# Patient Record
Sex: MALE | Employment: FULL TIME | URBAN - METROPOLITAN AREA
[De-identification: names, ages, dates, MRNs, and addresses within clinical notes are randomized per-mention and may not be internally consistent; named-entity substitution may affect disease eponyms.]

---

## 2017-05-15 RX ORDER — ATORVASTATIN CALCIUM 20 MG/1
TABLET, FILM COATED ORAL
Qty: 30 TABLET | Refills: 4 | Status: SHIPPED | OUTPATIENT
Start: 2017-05-15 | End: 2017-10-28

## 2017-05-15 NOTE — TELEPHONE ENCOUNTER
From: Sada Le  To: Lyle Torres MD  Sent: 5/14/2017 9:44 PM CDT  Subject: Medication Renewal Request    Original authorizing provider: MD Sada Dinh would like a refill of the following medications:  Atorvastatin Calcium 20 MG

## 2017-06-05 DIAGNOSIS — IMO0002 CHRONIC MIGRAINE: ICD-10-CM

## 2017-06-06 RX ORDER — PROPRANOLOL HYDROCHLORIDE 120 MG/1
120 CAPSULE, EXTENDED RELEASE ORAL DAILY
Qty: 90 CAPSULE | Refills: 3 | Status: CANCELLED | OUTPATIENT
Start: 2017-06-06

## 2017-06-06 NOTE — TELEPHONE ENCOUNTER
From: Alpa Dennison  To: Yoni Santana MD  Sent: 6/5/2017 7:22 PM CDT  Subject: Medication Renewal Request    Original authorizing provider: MD Alpa Multani would like a refill of the following medications:  Propranolol HCl  MG Oral Caps

## 2017-06-06 NOTE — TELEPHONE ENCOUNTER
#90/3 additional refills given at [address] [place] [address] 8/8/16. Should still have refills at pharmacy. Responded to patient via [employer] and provided reminder that follow up due in August 2017.

## 2017-08-08 DIAGNOSIS — IMO0002 CHRONIC MIGRAINE: ICD-10-CM

## 2017-08-09 NOTE — TELEPHONE ENCOUNTER
From: Kaila Schilling  Sent: 8/8/2017 9:36 PM CDT  Subject: Medication Renewal Request    Michael Abreu would like a refill of the following medications:  Propranolol HCl  MG Oral Capsule SR 24 Hr Madelyn Clark MD]    Preferred pharmacy: [id number]

## 2017-08-09 NOTE — TELEPHONE ENCOUNTER
Sent [employer] message to patient relaying the need to schedule an appointment. LMTCB.  Please assist the patient in setting up an appointment, in order to process refill request.         Medication: Propranolol    Date of last refill: 08/08/16  Date last

## 2017-08-10 NOTE — TELEPHONE ENCOUNTER
Pt scheduled an appt to see Dr. Lopez Brochure on 9/5/17. He states he only has 1 pill left of his propanolol.

## 2017-08-11 RX ORDER — PROPRANOLOL HYDROCHLORIDE 120 MG/1
120 CAPSULE, EXTENDED RELEASE ORAL DAILY
Qty: 90 CAPSULE | Refills: 0 | Status: SHIPPED
Start: 2017-08-11 | End: 2017-09-05

## 2017-09-05 ENCOUNTER — OFFICE VISIT (OUTPATIENT)
Dept: NEUROLOGY | Facility: CLINIC | Age: 52
End: 2017-09-05

## 2017-09-05 VITALS
HEART RATE: 76 BPM | DIASTOLIC BLOOD PRESSURE: 78 MMHG | SYSTOLIC BLOOD PRESSURE: 122 MMHG | WEIGHT: 231 LBS | BODY MASS INDEX: 30 KG/M2 | RESPIRATION RATE: 16 BRPM

## 2017-09-05 DIAGNOSIS — IMO0002 CHRONIC MIGRAINE: ICD-10-CM

## 2017-09-05 DIAGNOSIS — G43.109 MIGRAINE WITH AURA AND WITHOUT STATUS MIGRAINOSUS, NOT INTRACTABLE: Primary | ICD-10-CM

## 2017-09-05 DIAGNOSIS — R63.5 WEIGHT GAIN: ICD-10-CM

## 2017-09-05 PROCEDURE — 99213 OFFICE O/P EST LOW 20 MIN: CPT | Performed by: OTHER

## 2017-09-05 RX ORDER — PROPRANOLOL HYDROCHLORIDE 120 MG/1
120 CAPSULE, EXTENDED RELEASE ORAL DAILY
Qty: 90 CAPSULE | Refills: 3 | Status: SHIPPED | OUTPATIENT
Start: 2017-09-05 | End: 2018-11-06

## 2017-09-05 NOTE — PATIENT INSTRUCTIONS
Refill policies:    • Allow 2-3 business days for refills; controlled substances may take longer.   • Contact your pharmacy at least 5 days prior to running out of medication and have them send an electronic request or submit request through the [facility] have a procedure or additional testing performed. [facility]) will contact your insurance carrier to obtain pre-certification or prior authorization.     Unfortunately, [facility] has seen an increase in denial of payment even though the p

## 2017-09-05 NOTE — PROGRESS NOTES
[facility] in [place] with [facility]  Neurology - Clinic Follow up  2017    Yazan Schmid Patient Status:  No patient class for patient encounter    1965 MRN [id number]   Locat change, no throat pain or soreness; Respiratory: Denies: Difficulty Breathing, Chronic Cough and Wheezing. Cardiovascular: NO Chest Pain and Palpitations. GI: no abdominal pain, no nausea or diarrhea;  : no incontinence;   Neurological:  See history; MG Oral Capsule SR 24 Hr          Impression/Plan:  (G43.109) Migraine with aura and without status migrainosus, not intractable  (primary encounter diagnosis)    (G43.709) Chronic migraine  Plan: Propranolol HCl  MG Oral Capsule SR 24 Hr    (R63.5)

## 2017-09-13 ENCOUNTER — HOSPITAL ENCOUNTER (OUTPATIENT)
Age: 52
Discharge: HOME | End: 2017-09-13
Attending: FAMILY MEDICINE
Payer: COMMERCIAL

## 2017-09-13 VITALS
TEMPERATURE: 98 F | HEART RATE: 79 BPM | OXYGEN SATURATION: 97 % | DIASTOLIC BLOOD PRESSURE: 81 MMHG | RESPIRATION RATE: 20 BRPM | SYSTOLIC BLOOD PRESSURE: 124 MMHG

## 2017-09-13 DIAGNOSIS — L03.012 PARONYCHIA OF FINGER OF LEFT HAND: Primary | ICD-10-CM

## 2017-09-13 DIAGNOSIS — L03.012 CELLULITIS OF FINGER OF LEFT HAND: ICD-10-CM

## 2017-09-13 PROCEDURE — 99204 OFFICE O/P NEW MOD 45 MIN: CPT

## 2017-09-13 PROCEDURE — 99213 OFFICE O/P EST LOW 20 MIN: CPT

## 2017-09-13 RX ORDER — AMOXICILLIN AND CLAVULANATE POTASSIUM 875; 125 MG/1; MG/1
1 TABLET, FILM COATED ORAL 2 TIMES DAILY
Qty: 20 TABLET | Refills: 0 | Status: SHIPPED | OUTPATIENT
Start: 2017-09-13 | End: 2017-09-23

## 2017-09-13 NOTE — ED PROVIDER NOTES
Patient Seen in: [facility]    History   Patient presents with:  Finger Pain    Stated Complaint: 3 DAYS FINGER INJURY    HPI  46 yo M here with complaints of f care swelling and finger? Injury.   Now with a white discoloration GI: not distended   EXTREMITIES:     Exam of L Hand -->   - swelling: ++ noted around the nail of the L index finger - white yellow discoloration noted over the lateral edge of the nail of the second finger.   - deformity/defect: none noted   - crepitus: Refills: 0

## 2017-09-16 ENCOUNTER — OFFICE VISIT (OUTPATIENT)
Dept: INTERNAL MEDICINE CLINIC | Facility: CLINIC | Age: 52
End: 2017-09-16

## 2017-09-16 VITALS
HEART RATE: 68 BPM | OXYGEN SATURATION: 93 % | BODY MASS INDEX: 28.88 KG/M2 | HEIGHT: 74 IN | DIASTOLIC BLOOD PRESSURE: 82 MMHG | WEIGHT: 225 LBS | SYSTOLIC BLOOD PRESSURE: 116 MMHG | RESPIRATION RATE: 14 BRPM | TEMPERATURE: 98 F

## 2017-09-16 DIAGNOSIS — R06.83 SNORING: Primary | ICD-10-CM

## 2017-09-16 DIAGNOSIS — G47.30 SLEEP APNEA, UNSPECIFIED TYPE: ICD-10-CM

## 2017-09-16 PROCEDURE — 99213 OFFICE O/P EST LOW 20 MIN: CPT | Performed by: FAMILY MEDICINE

## 2017-09-16 NOTE — PROGRESS NOTES
HPI:    Patient ID: Kaila Schilling is a 45year old male.     HPI  Wife made appt   He snores a lot  Loudly   Nightly   Does have bouts of apnea       Does wake up at times   Is tired at times when awakens    Otherwise feels well    Review of Systems

## 2017-10-31 RX ORDER — ATORVASTATIN CALCIUM 20 MG/1
TABLET, FILM COATED ORAL
Qty: 30 TABLET | Refills: 0 | Status: SHIPPED | OUTPATIENT
Start: 2017-10-31 | End: 2017-12-02

## 2017-11-10 ENCOUNTER — TELEPHONE (OUTPATIENT)
Dept: INTERNAL MEDICINE CLINIC | Facility: CLINIC | Age: 52
End: 2017-11-10

## 2017-11-10 DIAGNOSIS — R73.09 OTHER ABNORMAL GLUCOSE: Primary | ICD-10-CM

## 2017-11-10 DIAGNOSIS — R68.82 DECREASED LIBIDO: ICD-10-CM

## 2017-11-10 DIAGNOSIS — E78.00 PURE HYPERCHOLESTEROLEMIA: ICD-10-CM

## 2017-12-01 ENCOUNTER — OFFICE VISIT (OUTPATIENT)
Dept: SLEEP CENTER | Facility: HOSPITAL | Age: 52
End: 2017-12-01
Attending: FAMILY MEDICINE
Payer: COMMERCIAL

## 2017-12-01 PROCEDURE — 95810 POLYSOM 6/> YRS 4/> PARAM: CPT

## 2017-12-02 ENCOUNTER — OFFICE VISIT (OUTPATIENT)
Dept: INTERNAL MEDICINE CLINIC | Facility: CLINIC | Age: 52
End: 2017-12-02

## 2017-12-02 VITALS
DIASTOLIC BLOOD PRESSURE: 82 MMHG | HEART RATE: 63 BPM | WEIGHT: 236.5 LBS | SYSTOLIC BLOOD PRESSURE: 130 MMHG | TEMPERATURE: 98 F | RESPIRATION RATE: 12 BRPM | BODY MASS INDEX: 30.35 KG/M2 | HEIGHT: 74 IN | OXYGEN SATURATION: 98 %

## 2017-12-02 DIAGNOSIS — E78.00 PURE HYPERCHOLESTEROLEMIA: Primary | ICD-10-CM

## 2017-12-02 DIAGNOSIS — G43.109 MIGRAINE WITH AURA AND WITHOUT STATUS MIGRAINOSUS, NOT INTRACTABLE: ICD-10-CM

## 2017-12-02 DIAGNOSIS — R73.09 OTHER ABNORMAL GLUCOSE: ICD-10-CM

## 2017-12-02 DIAGNOSIS — Z12.11 COLON CANCER SCREENING: ICD-10-CM

## 2017-12-02 PROCEDURE — 99213 OFFICE O/P EST LOW 20 MIN: CPT | Performed by: FAMILY MEDICINE

## 2017-12-02 RX ORDER — ATORVASTATIN CALCIUM 20 MG/1
TABLET, FILM COATED ORAL
Qty: 90 TABLET | Refills: 2 | Status: SHIPPED | OUTPATIENT
Start: 2017-12-02 | End: 2018-09-02

## 2017-12-02 NOTE — PROGRESS NOTES
HPI:    Patient ID: Joe Rivas is a 45year old male. HPI  Joe Riavs is a 45year old male.   HPI:   Here for med ck  Overall doing well  Had sleep study last night     Had labs recebtly     Taking meds  Cannot recall last HA      Current Outpat asked to return in 1yr.     Review of Systems         Current Outpatient Prescriptions:  ATORVASTATIN 20 MG Oral Tab TAKE ONE TABLET BY MOUTH AT BEDTIME  Disp: 30 tablet Rfl: 0   Propranolol HCl  MG Oral Capsule SR 24 Hr Take 1 capsule (120 mg total)

## 2017-12-05 NOTE — PROCEDURES
[address] [address]       Accredited by the [facility] ([facility])    PATIENT'S NAME:        Nikki Lyle  ATTENDING PHYSICIAN:   Ángel Saha M.D. REFERRING PHYSICIAN:   BETITO Charlton index of 19. There was worsening of sleep-disordered breathing in the supine position with a supine AHI of 24, lateral AHI of 7, as well as during stage REM sleep with a REM AHI of 26.   The patient had significant oxyhemoglobin desaturations with an oxyge

## 2018-09-04 RX ORDER — ATORVASTATIN CALCIUM 20 MG/1
TABLET, FILM COATED ORAL
Qty: 90 TABLET | Refills: 0 | Status: SHIPPED | OUTPATIENT
Start: 2018-09-04 | End: 2019-01-26

## 2018-11-02 ENCOUNTER — TELEPHONE (OUTPATIENT)
Dept: INTERNAL MEDICINE CLINIC | Facility: CLINIC | Age: 53
End: 2018-11-02

## 2018-11-02 DIAGNOSIS — Z00.00 ROUTINE GENERAL MEDICAL EXAMINATION AT A HEALTH CARE FACILITY: ICD-10-CM

## 2018-11-02 DIAGNOSIS — E78.00 PURE HYPERCHOLESTEROLEMIA: Primary | ICD-10-CM

## 2018-11-02 DIAGNOSIS — R73.09 OTHER ABNORMAL GLUCOSE: ICD-10-CM

## 2018-11-02 DIAGNOSIS — R63.5 WEIGHT GAIN: ICD-10-CM

## 2018-11-02 NOTE — TELEPHONE ENCOUNTER
Pt wife called to request we order labs before visit on 11/12/18 and call pt when orders are in system.

## 2018-11-06 DIAGNOSIS — IMO0002 CHRONIC MIGRAINE: ICD-10-CM

## 2018-11-06 RX ORDER — PROPRANOLOL HYDROCHLORIDE 120 MG/1
120 CAPSULE, EXTENDED RELEASE ORAL DAILY
Qty: 90 CAPSULE | Refills: 3 | Status: CANCELLED | OUTPATIENT
Start: 2018-11-06

## 2018-11-06 NOTE — TELEPHONE ENCOUNTER
LMTCB     Patient has not been seen since 9/2017. Needs appointment.     Medication: PROPRANOLOL HCL  MG     Date of last refill: 09/05/2017 (#90/3)  [place]     Last office visit: 09/05/2017  Due back to clinic per

## 2018-11-08 NOTE — TELEPHONE ENCOUNTER
Sent [employer] message to patient requesting an appointment be made before this Rx request can be filled.

## 2018-11-08 NOTE — TELEPHONE ENCOUNTER
Spoke with pharmacist regarding denial of Propranolol rx. Explained to pharmacist that patient has been called to make an appointment. Pharmacist states that he will relay the need for appointment to patient. Patient send MyChart message as well.

## 2018-11-08 NOTE — TELEPHONE ENCOUNTER
See additional TE dated 11/6/2018. Patient send MyChart message and pharmacist informed that patient needs appointment.

## 2018-11-09 RX ORDER — PROPRANOLOL HYDROCHLORIDE 120 MG/1
CAPSULE, EXTENDED RELEASE ORAL
Qty: 90 CAPSULE | Refills: 0 | Status: SHIPPED | OUTPATIENT
Start: 2018-11-09 | End: 2019-01-08

## 2018-11-09 NOTE — TELEPHONE ENCOUNTER
Patient called today and made appointment for 1/8/2019. Pended Rx with enough medication for patient to make it to next appointment. Routed to provider.

## 2018-11-09 NOTE — TELEPHONE ENCOUNTER
Patient called today. Made appointment for 1/8/2019. See  Additional TE 11/06/2018. Rx pended and sent for review.

## 2018-11-12 ENCOUNTER — OFFICE VISIT (OUTPATIENT)
Dept: INTERNAL MEDICINE CLINIC | Facility: CLINIC | Age: 53
End: 2018-11-12
Payer: COMMERCIAL

## 2018-11-12 VITALS
WEIGHT: 237.75 LBS | BODY MASS INDEX: 30.51 KG/M2 | HEART RATE: 64 BPM | DIASTOLIC BLOOD PRESSURE: 84 MMHG | TEMPERATURE: 98 F | RESPIRATION RATE: 12 BRPM | SYSTOLIC BLOOD PRESSURE: 126 MMHG | HEIGHT: 74 IN | OXYGEN SATURATION: 98 %

## 2018-11-12 DIAGNOSIS — Z00.00 WELLNESS EXAMINATION: Primary | ICD-10-CM

## 2018-11-12 DIAGNOSIS — Z12.11 COLON CANCER SCREENING: ICD-10-CM

## 2018-11-12 DIAGNOSIS — B35.1 ONYCHOMYCOSIS OF LEFT GREAT TOE: ICD-10-CM

## 2018-11-12 DIAGNOSIS — Z00.00 LABORATORY EXAM ORDERED AS PART OF ROUTINE GENERAL MEDICAL EXAMINATION: ICD-10-CM

## 2018-11-12 PROBLEM — G47.33 OSA (OBSTRUCTIVE SLEEP APNEA): Status: ACTIVE | Noted: 2018-11-12

## 2018-11-12 PROCEDURE — 99396 PREV VISIT EST AGE 40-64: CPT | Performed by: FAMILY MEDICINE

## 2018-11-12 RX ORDER — TERBINAFINE HYDROCHLORIDE 250 MG/1
250 TABLET ORAL DAILY
Qty: 30 TABLET | Refills: 2 | Status: SHIPPED | OUTPATIENT
Start: 2018-11-12 | End: 2019-02-04

## 2018-11-13 NOTE — PROGRESS NOTES
HPI:    Patient ID: Alpa Dennison is a 52year old male.     HPI  Apla Dennison is a 52year old male who presents for a complete physical exam.   HPI:       Wt Readings from Last 6 Encounters:  11/12/18 : 237 lb 12 oz  01/04/18 : 236 lb  12/02/17 : 236 l 0.0 oz    Drug use: No     .   Exercise: minimal.  Diet: watches minimally     REVIEW OF SYSTEMS:   GENERAL: feels well otherwise  SKIN: denies rash   L great toenail fungus    Has treaed  w topical things from podiatrist wo releif    HEENT: denies nasal c Systems         Current Outpatient Medications:  Chlorpheniramine-PSE-Ibuprofen (ADVIL ALLERGY SINUS) 2- MG Oral Tab Take 1 tablet by mouth daily. Disp:  Rfl:    Terbinafine HCl (LAMISIL) 250 MG Oral Tab Take 1 tablet (250 mg total) by mouth daily.  D

## 2018-11-15 ENCOUNTER — LAB ENCOUNTER (OUTPATIENT)
Dept: LAB | Age: 53
End: 2018-11-15
Attending: FAMILY MEDICINE
Payer: COMMERCIAL

## 2018-11-15 DIAGNOSIS — Z00.00 ROUTINE GENERAL MEDICAL EXAMINATION AT A HEALTH CARE FACILITY: ICD-10-CM

## 2018-11-15 DIAGNOSIS — E78.00 PURE HYPERCHOLESTEROLEMIA: Primary | ICD-10-CM

## 2018-11-15 DIAGNOSIS — R73.09 IMPAIRED GLUCOSE TOLERANCE TEST: ICD-10-CM

## 2018-11-15 DIAGNOSIS — R63.5 ABNORMAL WEIGHT GAIN: ICD-10-CM

## 2018-11-15 DIAGNOSIS — R63.5 WEIGHT GAIN: ICD-10-CM

## 2018-11-15 DIAGNOSIS — R73.09 OTHER ABNORMAL GLUCOSE: ICD-10-CM

## 2018-11-15 PROCEDURE — 80061 LIPID PANEL: CPT | Performed by: FAMILY MEDICINE

## 2018-11-15 PROCEDURE — 80053 COMPREHEN METABOLIC PANEL: CPT | Performed by: FAMILY MEDICINE

## 2018-11-15 PROCEDURE — 84153 ASSAY OF PSA TOTAL: CPT | Performed by: FAMILY MEDICINE

## 2018-11-15 PROCEDURE — 81003 URINALYSIS AUTO W/O SCOPE: CPT | Performed by: FAMILY MEDICINE

## 2018-11-15 PROCEDURE — 85025 COMPLETE CBC W/AUTO DIFF WBC: CPT | Performed by: FAMILY MEDICINE

## 2018-11-15 PROCEDURE — 36415 COLL VENOUS BLD VENIPUNCTURE: CPT | Performed by: FAMILY MEDICINE

## 2019-01-08 ENCOUNTER — OFFICE VISIT (OUTPATIENT)
Dept: NEUROLOGY | Facility: CLINIC | Age: 54
End: 2019-01-08
Payer: COMMERCIAL

## 2019-01-08 VITALS
DIASTOLIC BLOOD PRESSURE: 60 MMHG | WEIGHT: 235 LBS | SYSTOLIC BLOOD PRESSURE: 122 MMHG | BODY MASS INDEX: 30 KG/M2 | RESPIRATION RATE: 18 BRPM | HEART RATE: 78 BPM

## 2019-01-08 DIAGNOSIS — G43.001 MIGRAINE WITHOUT AURA AND WITH STATUS MIGRAINOSUS, NOT INTRACTABLE: ICD-10-CM

## 2019-01-08 DIAGNOSIS — G47.33 OSA (OBSTRUCTIVE SLEEP APNEA): Primary | ICD-10-CM

## 2019-01-08 DIAGNOSIS — IMO0002 CHRONIC MIGRAINE: ICD-10-CM

## 2019-01-08 PROCEDURE — 99213 OFFICE O/P EST LOW 20 MIN: CPT | Performed by: OTHER

## 2019-01-08 RX ORDER — PROPRANOLOL HYDROCHLORIDE 120 MG/1
120 CAPSULE, EXTENDED RELEASE ORAL
Qty: 90 CAPSULE | Refills: 3 | Status: SHIPPED | OUTPATIENT
Start: 2019-01-08 | End: 2020-03-02

## 2019-01-08 NOTE — PROGRESS NOTES
The patient is here for a follow-up for migraines. The patient states he has about 2 migraines in the past 3 months.

## 2019-01-08 NOTE — PROGRESS NOTES
[facility] in [place] with [facility]  Neurology - Clinic Follow up  2017    Dinah Hutton Patient Status:  No patient class for patient encounter    1965 MRN [id number]   Locat PROPRANOLOL HCL  MG Oral Capsule SR 24 Hr TAKE ONE CAPSULE BY MOUTH ONE TIME DAILY  Disp: 90 capsule Rfl: 0   Chlorpheniramine-PSE-Ibuprofen (ADVIL ALLERGY SINUS) 2- MG Oral Tab Take 1 tablet by mouth daily.    Disp:  Rfl:    ATORVASTATIN 20 M sense is normal;  DTRs   Symmetric 2/4 in all limbs,   No Babinski sign,   COORDINATION: Normal FTN and HTS tests,   GAIT:  Normal gait, can do  tandem walk, romberg test is negative,       Problem List:  Problem List Items Addressed This Visit     None

## 2019-01-14 ENCOUNTER — TELEPHONE (OUTPATIENT)
Dept: INTERNAL MEDICINE CLINIC | Facility: CLINIC | Age: 54
End: 2019-01-14

## 2019-01-26 RX ORDER — ATORVASTATIN CALCIUM 20 MG/1
TABLET, FILM COATED ORAL
Qty: 90 TABLET | Refills: 0 | OUTPATIENT
Start: 2019-01-26

## 2019-01-26 RX ORDER — ATORVASTATIN CALCIUM 20 MG/1
20 TABLET, FILM COATED ORAL DAILY
Qty: 90 TABLET | Refills: 0 | Status: SHIPPED | OUTPATIENT
Start: 2019-01-26 | End: 2019-04-19

## 2019-01-26 NOTE — TELEPHONE ENCOUNTER
Approved per protocol  Atorvastatin  Last OV relevant to medication: 12/20/17  Last refill date: 9-4-18  #/refills: 0  When pt was asked to return for OV: none  Upcoming appt/reason: none  Recent labs: 11-15-18: PSA/ UA/ CBC/ Lipid/ CMP/ HgBA1C

## 2019-03-15 ENCOUNTER — OFFICE VISIT (OUTPATIENT)
Dept: INTERNAL MEDICINE CLINIC | Facility: CLINIC | Age: 54
End: 2019-03-15
Payer: COMMERCIAL

## 2019-03-15 VITALS
BODY MASS INDEX: 29.33 KG/M2 | HEIGHT: 74 IN | HEART RATE: 74 BPM | DIASTOLIC BLOOD PRESSURE: 76 MMHG | TEMPERATURE: 98 F | WEIGHT: 228.5 LBS | RESPIRATION RATE: 16 BRPM | OXYGEN SATURATION: 96 % | SYSTOLIC BLOOD PRESSURE: 124 MMHG

## 2019-03-15 DIAGNOSIS — J01.00 SUBACUTE MAXILLARY SINUSITIS: Primary | ICD-10-CM

## 2019-03-15 PROCEDURE — 99213 OFFICE O/P EST LOW 20 MIN: CPT | Performed by: FAMILY MEDICINE

## 2019-03-15 RX ORDER — CEFDINIR 300 MG/1
300 CAPSULE ORAL 2 TIMES DAILY
Qty: 20 CAPSULE | Refills: 0 | Status: SHIPPED | OUTPATIENT
Start: 2019-03-15 | End: 2019-07-08

## 2019-03-15 NOTE — PROGRESS NOTES
HPI:    Patient ID: Bunny Johnston is a 62year old male. HPI  HPI:   Bunny Johnston is a 62year old male who presents for upper respiratory symptoms for  3  months. Patient reports off on URI s/s.   Cough   Clear to ylw   facila pressure    Ears pressu rashes  EYES:conj clear  HEENT: atraumatic, normocephalic,ears and throat are clear  NECK: supple,no adenopathy,  LUNGS: clear to auscultation        ASSESSMENT AND PLAN:   Chandrika Wise is a 62year old male who presents with Sinusitis. PLAN: cefdinir.

## 2019-04-22 RX ORDER — ATORVASTATIN CALCIUM 20 MG/1
TABLET, FILM COATED ORAL
Qty: 30 TABLET | Refills: 1 | Status: SHIPPED | OUTPATIENT
Start: 2019-04-22 | End: 2019-05-19

## 2019-04-22 NOTE — TELEPHONE ENCOUNTER
Last OV: 3/15/19 with Dr. Heather Combs  Last refill date: 1/26/19     #/refills: #90, 0 refills  When pt was asked to return for OV: no follow-up on file  Upcoming appt/reason: no upcoming appt  Last labs November 2018

## 2019-05-20 RX ORDER — ATORVASTATIN CALCIUM 20 MG/1
TABLET, FILM COATED ORAL
Qty: 30 TABLET | Refills: 0 | Status: SHIPPED | OUTPATIENT
Start: 2019-05-20 | End: 2019-06-15

## 2019-06-17 RX ORDER — ATORVASTATIN CALCIUM 20 MG/1
TABLET, FILM COATED ORAL
Qty: 30 TABLET | Refills: 3 | Status: SHIPPED | OUTPATIENT
Start: 2019-06-17 | End: 2019-09-21

## 2019-07-03 ENCOUNTER — APPOINTMENT (OUTPATIENT)
Dept: LAB | Age: 54
End: 2019-07-03
Attending: FAMILY MEDICINE
Payer: COMMERCIAL

## 2019-07-03 DIAGNOSIS — Z00.00 WELLNESS EXAMINATION: ICD-10-CM

## 2019-07-03 DIAGNOSIS — E78.00 PURE HYPERCHOLESTEROLEMIA: ICD-10-CM

## 2019-07-03 LAB
ALBUMIN SERPL-MCNC: 3.5 G/DL (ref 3.4–5)
ALBUMIN/GLOB SERPL: 1 {RATIO} (ref 1–2)
ALP LIVER SERPL-CCNC: 48 U/L (ref 45–117)
ALT SERPL-CCNC: 33 U/L (ref 16–61)
ANION GAP SERPL CALC-SCNC: 7 MMOL/L (ref 0–18)
AST SERPL-CCNC: 15 U/L (ref 15–37)
BILIRUB SERPL-MCNC: 1.2 MG/DL (ref 0.1–2)
BUN BLD-MCNC: 12 MG/DL (ref 7–18)
BUN/CREAT SERPL: 12.5 (ref 10–20)
CALCIUM BLD-MCNC: 9.3 MG/DL (ref 8.5–10.1)
CHLORIDE SERPL-SCNC: 108 MMOL/L (ref 98–112)
CHOLEST SMN-MCNC: 124 MG/DL (ref ?–200)
CO2 SERPL-SCNC: 26 MMOL/L (ref 21–32)
CREAT BLD-MCNC: 0.96 MG/DL (ref 0.7–1.3)
GLOBULIN PLAS-MCNC: 3.6 G/DL (ref 2.8–4.4)
GLUCOSE BLD-MCNC: 93 MG/DL (ref 70–99)
HDLC SERPL-MCNC: 44 MG/DL (ref 40–59)
LDLC SERPL CALC-MCNC: 71 MG/DL (ref ?–100)
M PROTEIN MFR SERPL ELPH: 7.1 G/DL (ref 6.4–8.2)
NONHDLC SERPL-MCNC: 80 MG/DL (ref ?–130)
OSMOLALITY SERPL CALC.SUM OF ELEC: 291 MOSM/KG (ref 275–295)
POTASSIUM SERPL-SCNC: 4.1 MMOL/L (ref 3.5–5.1)
SODIUM SERPL-SCNC: 141 MMOL/L (ref 136–145)
TRIGL SERPL-MCNC: 43 MG/DL (ref 30–149)
VLDLC SERPL CALC-MCNC: 9 MG/DL (ref 0–30)

## 2019-07-03 PROCEDURE — 36415 COLL VENOUS BLD VENIPUNCTURE: CPT

## 2019-07-03 PROCEDURE — 80053 COMPREHEN METABOLIC PANEL: CPT

## 2019-07-03 PROCEDURE — 80061 LIPID PANEL: CPT

## 2019-07-08 ENCOUNTER — OFFICE VISIT (OUTPATIENT)
Dept: INTERNAL MEDICINE CLINIC | Facility: CLINIC | Age: 54
End: 2019-07-08
Payer: COMMERCIAL

## 2019-07-08 VITALS
RESPIRATION RATE: 12 BRPM | BODY MASS INDEX: 29.23 KG/M2 | OXYGEN SATURATION: 97 % | WEIGHT: 227.75 LBS | HEIGHT: 74 IN | SYSTOLIC BLOOD PRESSURE: 120 MMHG | HEART RATE: 55 BPM | TEMPERATURE: 98 F | DIASTOLIC BLOOD PRESSURE: 80 MMHG

## 2019-07-08 DIAGNOSIS — M54.50 CHRONIC BILATERAL LOW BACK PAIN WITHOUT SCIATICA: ICD-10-CM

## 2019-07-08 DIAGNOSIS — R73.09 OTHER ABNORMAL GLUCOSE: ICD-10-CM

## 2019-07-08 DIAGNOSIS — E78.00 PURE HYPERCHOLESTEROLEMIA: Primary | ICD-10-CM

## 2019-07-08 DIAGNOSIS — G89.29 CHRONIC BILATERAL LOW BACK PAIN WITHOUT SCIATICA: ICD-10-CM

## 2019-07-08 PROCEDURE — 99213 OFFICE O/P EST LOW 20 MIN: CPT | Performed by: FAMILY MEDICINE

## 2019-07-08 NOTE — PROGRESS NOTES
HPI:    Patient ID: Robert Conner is a 62year old male.     HPI  Here for review of the labs   Did loose 10lbs from last appt   Eating better    Less portions      Is getting  occ LBP off on   Has had for yrs   Had PT and chiropractor    Uses advil or and

## 2019-09-23 RX ORDER — ATORVASTATIN CALCIUM 20 MG/1
TABLET, FILM COATED ORAL
Qty: 90 TABLET | Refills: 1 | Status: SHIPPED | OUTPATIENT
Start: 2019-09-23 | End: 2020-03-02

## 2019-09-23 NOTE — TELEPHONE ENCOUNTER
Amlodipine 20 Mg Oral Tab    Passed Protocol    Last OV relevant to medication: 7/8/2019  Last refill date: 6/17/2019     #/refills: #30 w/ 3 refills   When pt was asked to return for OV: none noted  Upcoming appt/reason: No future appointments   Lab Resul

## 2020-03-02 DIAGNOSIS — IMO0002 CHRONIC MIGRAINE: ICD-10-CM

## 2020-03-02 RX ORDER — PROPRANOLOL HYDROCHLORIDE 120 MG/1
120 CAPSULE, EXTENDED RELEASE ORAL
Qty: 30 CAPSULE | Refills: 1 | Status: SHIPPED | OUTPATIENT
Start: 2020-03-02 | End: 2020-04-21

## 2020-03-02 RX ORDER — ATORVASTATIN CALCIUM 20 MG/1
TABLET, FILM COATED ORAL
Qty: 90 TABLET | Refills: 0 | Status: SHIPPED | OUTPATIENT
Start: 2020-03-02 | End: 2020-06-22

## 2020-03-02 NOTE — TELEPHONE ENCOUNTER
Patient scheduled f/u jose't on 4/21.       Medication: Propranolol    Date of last refill: 1/8/19 (#90/3)  [place]     Last office visit: 1/8/19  Due back to clinic per last office note:  1 year  Date next office visit sc

## 2020-03-27 ENCOUNTER — TELEPHONE (OUTPATIENT)
Dept: NEUROLOGY | Facility: CLINIC | Age: 55
End: 2020-03-27

## 2020-03-27 NOTE — TELEPHONE ENCOUNTER
MIGUE on VM's that 4/21 jose't cancelled due to CV outbreak and he can schedule a Tele Visit at same time and date or reschedule jose't in May or June.

## 2020-04-21 ENCOUNTER — VIRTUAL PHONE E/M (OUTPATIENT)
Dept: NEUROLOGY | Facility: CLINIC | Age: 55
End: 2020-04-21
Payer: COMMERCIAL

## 2020-04-21 DIAGNOSIS — IMO0002 CHRONIC MIGRAINE: ICD-10-CM

## 2020-04-21 PROCEDURE — 99213 OFFICE O/P EST LOW 20 MIN: CPT | Performed by: OTHER

## 2020-04-21 RX ORDER — PROPRANOLOL HYDROCHLORIDE 120 MG/1
120 CAPSULE, EXTENDED RELEASE ORAL
Qty: 90 CAPSULE | Refills: 3 | Status: SHIPPED | OUTPATIENT
Start: 2020-04-21 | End: 2020-05-06

## 2020-05-05 DIAGNOSIS — IMO0002 CHRONIC MIGRAINE: ICD-10-CM

## 2020-05-06 RX ORDER — PROPRANOLOL HYDROCHLORIDE 120 MG/1
CAPSULE, EXTENDED RELEASE ORAL
Qty: 30 CAPSULE | Refills: 0 | Status: SHIPPED | OUTPATIENT
Start: 2020-05-06 | End: 2020-06-02

## 2020-06-01 DIAGNOSIS — IMO0002 CHRONIC MIGRAINE: ICD-10-CM

## 2020-06-02 RX ORDER — PROPRANOLOL HYDROCHLORIDE 120 MG/1
CAPSULE, EXTENDED RELEASE ORAL
Qty: 30 CAPSULE | Refills: 10 | Status: SHIPPED | OUTPATIENT
Start: 2020-06-02 | End: 2021-05-03

## 2020-06-02 NOTE — TELEPHONE ENCOUNTER
Medication: PROPRANOLOL HCL  MG     Date of last refill: 5/6/20 (#30/0)  [place]     Last office visit: Visit date not found  Due back to clinic per last office note:  1 year  Date next office visit scheduled:   No

## 2020-06-22 RX ORDER — ATORVASTATIN CALCIUM 20 MG/1
TABLET, FILM COATED ORAL
Qty: 90 TABLET | Refills: 0 | Status: SHIPPED | OUTPATIENT
Start: 2020-06-22 | End: 2020-09-23

## 2020-09-16 ENCOUNTER — TELEPHONE (OUTPATIENT)
Dept: INTERNAL MEDICINE CLINIC | Facility: CLINIC | Age: 55
End: 2020-09-16

## 2020-09-16 NOTE — TELEPHONE ENCOUNTER
Lm for pt to return call. Pt is OVERDUE for Px and labs. LOV was 07-. Once appointment is made, we will order labs (route this back to Medical assistants).     Atorvastatin 20 mg failed protocol due to  Cholesterol Medication Protocol Failed9/16 3:4

## 2020-09-17 PROBLEM — Z86.010 HISTORY OF COLON POLYPS: Status: ACTIVE | Noted: 2020-09-17

## 2020-09-17 PROBLEM — Z80.0 FAMILY HISTORY OF COLON CANCER: Status: ACTIVE | Noted: 2020-09-17

## 2020-09-17 PROBLEM — Z86.0100 HISTORY OF COLON POLYPS: Status: ACTIVE | Noted: 2020-09-17

## 2020-09-18 RX ORDER — ATORVASTATIN CALCIUM 20 MG/1
TABLET, FILM COATED ORAL
Qty: 30 TABLET | Refills: 0 | OUTPATIENT
Start: 2020-09-18

## 2020-09-24 RX ORDER — ATORVASTATIN CALCIUM 20 MG/1
20 TABLET, FILM COATED ORAL NIGHTLY
Qty: 30 TABLET | Refills: 0 | Status: SHIPPED | OUTPATIENT
Start: 2020-09-24 | End: 2020-10-23

## 2020-09-25 NOTE — TELEPHONE ENCOUNTER
Please contact patient for virtual visit with TO; unable to reach while working remote/pt number states no private calls taken

## 2020-10-21 NOTE — TELEPHONE ENCOUNTER
atorvastatin 20 MG   [id number] [address] [phone], [phone]

## 2020-10-22 ENCOUNTER — PATIENT MESSAGE (OUTPATIENT)
Dept: INTERNAL MEDICINE CLINIC | Facility: CLINIC | Age: 55
End: 2020-10-22

## 2020-10-22 DIAGNOSIS — Z00.00 ROUTINE PHYSICAL EXAMINATION: Primary | ICD-10-CM

## 2020-10-22 RX ORDER — ATORVASTATIN CALCIUM 20 MG/1
20 TABLET, FILM COATED ORAL NIGHTLY
Qty: 30 TABLET | Refills: 0 | Status: SHIPPED | OUTPATIENT
Start: 2020-10-22 | End: 2020-10-23

## 2020-10-22 RX ORDER — ATORVASTATIN CALCIUM 20 MG/1
20 TABLET, FILM COATED ORAL NIGHTLY
Qty: 30 TABLET | Refills: 0 | OUTPATIENT
Start: 2020-10-22

## 2020-10-22 NOTE — TELEPHONE ENCOUNTER
Patient made appt for 10/31/20    Please fill previous request and sign labs from that request as well.

## 2020-10-23 RX ORDER — ATORVASTATIN CALCIUM 20 MG/1
20 TABLET, FILM COATED ORAL NIGHTLY
Qty: 30 TABLET | Refills: 0 | Status: SHIPPED | OUTPATIENT
Start: 2020-10-23 | End: 2020-10-31

## 2020-10-24 RX ORDER — ATORVASTATIN CALCIUM 20 MG/1
20 TABLET, FILM COATED ORAL NIGHTLY
Qty: 30 TABLET | Refills: 0 | OUTPATIENT
Start: 2020-10-24

## 2020-10-27 ENCOUNTER — TELEPHONE (OUTPATIENT)
Dept: INTERNAL MEDICINE CLINIC | Facility: CLINIC | Age: 55
End: 2020-10-27

## 2020-10-27 DIAGNOSIS — Z12.5 SPECIAL SCREENING FOR MALIGNANT NEOPLASM OF PROSTATE: ICD-10-CM

## 2020-10-27 DIAGNOSIS — R73.09 OTHER ABNORMAL GLUCOSE: ICD-10-CM

## 2020-10-27 DIAGNOSIS — E78.00 PURE HYPERCHOLESTEROLEMIA: Primary | ICD-10-CM

## 2020-10-27 DIAGNOSIS — Z13.0 SCREENING FOR IRON DEFICIENCY ANEMIA: ICD-10-CM

## 2020-10-27 NOTE — TELEPHONE ENCOUNTER
Patient calling from [facility] to ask for orders to be faxed to Q = Fax [phone].   Went to fax per request orders are pended; requested from [facility] to enter orders to fax

## 2020-10-27 NOTE — TELEPHONE ENCOUNTER
Patient Melinda Aguirre b/c he was waiting too long for fax so he is calling to schedule w/ [facility] now. \"  As courtesy scheduled labs for TH Afternoon.

## 2020-10-27 NOTE — TELEPHONE ENCOUNTER
[facility] at [facility] and needs his orders put in for his labs. Currently in pending mode and Dr. Sarah Garcia not here to ok. Orders were put back in again for me to sign.

## 2020-10-29 ENCOUNTER — APPOINTMENT (OUTPATIENT)
Dept: LAB | Age: 55
End: 2020-10-29
Attending: FAMILY MEDICINE
Payer: COMMERCIAL

## 2020-10-29 PROCEDURE — 80053 COMPREHEN METABOLIC PANEL: CPT | Performed by: FAMILY MEDICINE

## 2020-10-29 PROCEDURE — 80061 LIPID PANEL: CPT | Performed by: FAMILY MEDICINE

## 2020-10-29 PROCEDURE — 84153 ASSAY OF PSA TOTAL: CPT | Performed by: FAMILY MEDICINE

## 2020-10-29 PROCEDURE — 85025 COMPLETE CBC W/AUTO DIFF WBC: CPT | Performed by: FAMILY MEDICINE

## 2020-10-29 PROCEDURE — 36415 COLL VENOUS BLD VENIPUNCTURE: CPT | Performed by: FAMILY MEDICINE

## 2020-10-31 ENCOUNTER — OFFICE VISIT (OUTPATIENT)
Dept: INTERNAL MEDICINE CLINIC | Facility: CLINIC | Age: 55
End: 2020-10-31
Payer: COMMERCIAL

## 2020-10-31 VITALS
HEART RATE: 83 BPM | RESPIRATION RATE: 16 BRPM | WEIGHT: 228.25 LBS | BODY MASS INDEX: 29.29 KG/M2 | SYSTOLIC BLOOD PRESSURE: 116 MMHG | DIASTOLIC BLOOD PRESSURE: 80 MMHG | HEIGHT: 74 IN | OXYGEN SATURATION: 98 % | TEMPERATURE: 98 F

## 2020-10-31 DIAGNOSIS — R73.09 OTHER ABNORMAL GLUCOSE: ICD-10-CM

## 2020-10-31 DIAGNOSIS — E78.00 PURE HYPERCHOLESTEROLEMIA: Primary | ICD-10-CM

## 2020-10-31 DIAGNOSIS — IMO0002 CHRONIC MIGRAINE: ICD-10-CM

## 2020-10-31 PROCEDURE — 3079F DIAST BP 80-89 MM HG: CPT | Performed by: FAMILY MEDICINE

## 2020-10-31 PROCEDURE — 3008F BODY MASS INDEX DOCD: CPT | Performed by: FAMILY MEDICINE

## 2020-10-31 PROCEDURE — 99213 OFFICE O/P EST LOW 20 MIN: CPT | Performed by: FAMILY MEDICINE

## 2020-10-31 PROCEDURE — 3074F SYST BP LT 130 MM HG: CPT | Performed by: FAMILY MEDICINE

## 2020-10-31 RX ORDER — ATORVASTATIN CALCIUM 20 MG/1
20 TABLET, FILM COATED ORAL NIGHTLY
Qty: 90 TABLET | Refills: 2 | Status: SHIPPED | OUTPATIENT
Start: 2020-10-31 | End: 2021-08-19

## 2020-10-31 NOTE — PROGRESS NOTES
Scottie Nance is a 60year old male. HPI:   Pt is here for med ck/follow up. Overall is doing well. Is taking meds as directed. No issues w medications. Is staying healthy given the [place] pandemic.     No f/c  no unusual cough  no smell or taste iss meds   CPM         The patient indicates understanding of these issues and agrees to the plan. Aryan Dean

## 2021-05-01 DIAGNOSIS — IMO0002 CHRONIC MIGRAINE: ICD-10-CM

## 2021-05-03 RX ORDER — PROPRANOLOL HYDROCHLORIDE 120 MG/1
CAPSULE, EXTENDED RELEASE ORAL
Qty: 30 CAPSULE | Refills: 2 | Status: SHIPPED | OUTPATIENT
Start: 2021-05-03 | End: 2021-07-23

## 2021-05-03 NOTE — TELEPHONE ENCOUNTER
Sent the patient a Arav message to make an appt for further medication refills.        Medication: PROPRANOLOL HCL  MG Oral Capsule SR 24 Hr    Date of last refill: 06/02/2020 (#30/10)  [place]    Last office

## 2021-07-23 ENCOUNTER — OFFICE VISIT (OUTPATIENT)
Dept: NEUROLOGY | Facility: CLINIC | Age: 56
End: 2021-07-23
Payer: COMMERCIAL

## 2021-07-23 VITALS
SYSTOLIC BLOOD PRESSURE: 112 MMHG | BODY MASS INDEX: 30 KG/M2 | DIASTOLIC BLOOD PRESSURE: 64 MMHG | HEART RATE: 78 BPM | WEIGHT: 230 LBS | RESPIRATION RATE: 14 BRPM

## 2021-07-23 DIAGNOSIS — IMO0002 CHRONIC MIGRAINE: Primary | ICD-10-CM

## 2021-07-23 PROCEDURE — 3074F SYST BP LT 130 MM HG: CPT | Performed by: OTHER

## 2021-07-23 PROCEDURE — 99214 OFFICE O/P EST MOD 30 MIN: CPT | Performed by: OTHER

## 2021-07-23 PROCEDURE — 3078F DIAST BP <80 MM HG: CPT | Performed by: OTHER

## 2021-07-23 RX ORDER — PROPRANOLOL HYDROCHLORIDE 120 MG/1
120 CAPSULE, EXTENDED RELEASE ORAL DAILY
Qty: 30 CAPSULE | Refills: 11 | Status: SHIPPED | OUTPATIENT
Start: 2021-07-23

## 2021-07-23 NOTE — PROGRESS NOTES
[facility] [place] [facility] [place]  Neurology - Clinic Follow up  2021    Tanya Beltrán Patient Status:  No patient class for patient encounter    1965 MRN [id number]   [employer] (120 mg total) by mouth daily. 30 capsule 11   • atorvastatin 20 MG Oral Tab Take 1 tablet (20 mg total) by mouth nightly. 90 tablet 2       REVIEW OF SYSTEMS:  General: denies any fever or chills.      Eye: no pain or redness;  Ear, mouth, throat: no heari Problem List:  Problem List Items Addressed This Visit     Chronic migraine - Primary    Relevant Medications    Propranolol HCl  MG Oral Capsule SR 24 Hr          Impression/Plan:  (G43.644) Chronic migraine  (primary encounter diagnosis)  Riki

## 2021-08-19 RX ORDER — ATORVASTATIN CALCIUM 20 MG/1
20 TABLET, FILM COATED ORAL NIGHTLY
Qty: 90 TABLET | Refills: 0 | Status: SHIPPED | OUTPATIENT
Start: 2021-08-19 | End: 2021-11-24

## 2021-11-17 ENCOUNTER — TELEPHONE (OUTPATIENT)
Dept: INTERNAL MEDICINE CLINIC | Facility: CLINIC | Age: 56
End: 2021-11-17

## 2021-11-17 RX ORDER — ATORVASTATIN CALCIUM 20 MG/1
TABLET, FILM COATED ORAL
Qty: 90 TABLET | Refills: 0 | OUTPATIENT
Start: 2021-11-17

## 2021-11-17 NOTE — TELEPHONE ENCOUNTER
Sent [employer], pt is due for Px and lab work.      Atorvastatin 20 mg failed protocol due to  Cholesterol Medication Protocol Failed 11/17/2021 01:31 AM   Protocol Details  ALT < 80    ALT resulted within past year    Lipid panel within past 12 month

## 2021-11-19 ENCOUNTER — PATIENT MESSAGE (OUTPATIENT)
Dept: INTERNAL MEDICINE CLINIC | Facility: CLINIC | Age: 56
End: 2021-11-19

## 2021-11-21 NOTE — TELEPHONE ENCOUNTER
From: Carson Palacios  To: Jocelynn Lopez  Sent: 11/17/2021 9:06 AM CST  Subject: Appointment    Good morning Michael,     We received a refill request, however, you are due for your annual physical and lab work.  Please call our office to schedule an appointment as

## 2021-11-23 RX ORDER — ATORVASTATIN CALCIUM 20 MG/1
20 TABLET, FILM COATED ORAL NIGHTLY
Qty: 90 TABLET | Refills: 0 | OUTPATIENT
Start: 2021-11-23

## 2021-11-23 NOTE — TELEPHONE ENCOUNTER
Sent [employer], pt is due for Px and lab work. atorvastatin 20 MG Oral Tab           Possible duplicate: Hover to review recent actions on this medication    Sig: Take 1 tablet (20 mg total) by mouth nightly.  APPT/PX DUE OCTOBER    Disp

## 2021-11-23 NOTE — TELEPHONE ENCOUNTER
Pt last seen 10/2020. Pt over due for appt, Px and labs. Kaity Orlando for one month once appt made.

## 2021-11-24 ENCOUNTER — TELEPHONE (OUTPATIENT)
Dept: INTERNAL MEDICINE CLINIC | Facility: CLINIC | Age: 56
End: 2021-11-24

## 2021-11-24 RX ORDER — ATORVASTATIN CALCIUM 20 MG/1
20 TABLET, FILM COATED ORAL NIGHTLY
Qty: 7 TABLET | Refills: 0 | Status: SHIPPED | OUTPATIENT
Start: 2021-11-24 | End: 2021-11-28

## 2021-11-24 NOTE — TELEPHONE ENCOUNTER
Future Appointments   Date Time Provider Julieta Kent   11/29/2021  9:00 AM Linda Barr MD [facility] [place]     Pt states he is completely out

## 2021-11-24 NOTE — TELEPHONE ENCOUNTER
Pts spouse scheduled cpx for the pt and is requesting lab orders to be placed prior to the appt.     Future Appointments   Date Time Provider Julieta Kent   11/29/2021  9:00 AM Venice Mari MD [facility] [place]

## 2021-11-28 RX ORDER — ATORVASTATIN CALCIUM 20 MG/1
TABLET, FILM COATED ORAL
Qty: 7 TABLET | Refills: 0 | Status: SHIPPED | OUTPATIENT
Start: 2021-11-28 | End: 2021-11-29

## 2021-11-29 ENCOUNTER — LAB ENCOUNTER (OUTPATIENT)
Dept: LAB | Age: 56
End: 2021-11-29
Attending: FAMILY MEDICINE
Payer: COMMERCIAL

## 2021-11-29 ENCOUNTER — OFFICE VISIT (OUTPATIENT)
Dept: INTERNAL MEDICINE CLINIC | Facility: CLINIC | Age: 56
End: 2021-11-29
Payer: COMMERCIAL

## 2021-11-29 VITALS
HEART RATE: 86 BPM | RESPIRATION RATE: 20 BRPM | BODY MASS INDEX: 29.65 KG/M2 | OXYGEN SATURATION: 97 % | HEIGHT: 74 IN | TEMPERATURE: 98 F | SYSTOLIC BLOOD PRESSURE: 122 MMHG | DIASTOLIC BLOOD PRESSURE: 82 MMHG | WEIGHT: 231 LBS

## 2021-11-29 DIAGNOSIS — Z00.00 LABORATORY EXAM ORDERED AS PART OF ROUTINE GENERAL MEDICAL EXAMINATION: ICD-10-CM

## 2021-11-29 DIAGNOSIS — Z00.00 ROUTINE PHYSICAL EXAMINATION: Primary | ICD-10-CM

## 2021-11-29 DIAGNOSIS — Z00.00 ROUTINE PHYSICAL EXAMINATION: ICD-10-CM

## 2021-11-29 PROCEDURE — 85025 COMPLETE CBC W/AUTO DIFF WBC: CPT | Performed by: FAMILY MEDICINE

## 2021-11-29 PROCEDURE — 80053 COMPREHEN METABOLIC PANEL: CPT | Performed by: FAMILY MEDICINE

## 2021-11-29 PROCEDURE — 84153 ASSAY OF PSA TOTAL: CPT | Performed by: FAMILY MEDICINE

## 2021-11-29 PROCEDURE — 3079F DIAST BP 80-89 MM HG: CPT | Performed by: FAMILY MEDICINE

## 2021-11-29 PROCEDURE — 3008F BODY MASS INDEX DOCD: CPT | Performed by: FAMILY MEDICINE

## 2021-11-29 PROCEDURE — 99396 PREV VISIT EST AGE 40-64: CPT | Performed by: FAMILY MEDICINE

## 2021-11-29 PROCEDURE — 80061 LIPID PANEL: CPT | Performed by: FAMILY MEDICINE

## 2021-11-29 PROCEDURE — 3074F SYST BP LT 130 MM HG: CPT | Performed by: FAMILY MEDICINE

## 2021-11-29 PROCEDURE — 83036 HEMOGLOBIN GLYCOSYLATED A1C: CPT | Performed by: FAMILY MEDICINE

## 2021-11-29 RX ORDER — ATORVASTATIN CALCIUM 20 MG/1
20 TABLET, FILM COATED ORAL NIGHTLY
Qty: 90 TABLET | Refills: 0 | Status: SHIPPED | OUTPATIENT
Start: 2021-11-29

## 2021-11-29 NOTE — PROGRESS NOTES
Cecy Hoff is a 60year old male who presents for a complete physical exam.   HPI:       Wt Readings from Last 6 Encounters:  11/29/21 : 231 lb (104.8 kg)  07/23/21 : 230 lb (104.3 kg)  12/15/20 : 231 lb (104.8 kg)  10/31/20 : 228 lb 4 oz (103.5 kg)  1 Location: [address] [place] [address]      Family History   Problem Relation Age of Onset   • Other (colon ca) Other         3 of 9 siblings of father      Social History:  Social History    Tobacco Use      Smoking status: Never Smoker      Smokeless tobacc

## 2021-12-01 ENCOUNTER — LAB ENCOUNTER (OUTPATIENT)
Dept: LAB | Age: 56
End: 2021-12-01
Attending: FAMILY MEDICINE
Payer: COMMERCIAL

## 2021-12-01 DIAGNOSIS — Z00.00 LABORATORY EXAM ORDERED AS PART OF ROUTINE GENERAL MEDICAL EXAMINATION: ICD-10-CM

## 2021-12-01 DIAGNOSIS — Z00.00 ROUTINE PHYSICAL EXAMINATION: ICD-10-CM

## 2021-12-01 PROCEDURE — 81001 URINALYSIS AUTO W/SCOPE: CPT | Performed by: FAMILY MEDICINE

## 2022-02-25 RX ORDER — ATORVASTATIN CALCIUM 20 MG/1
TABLET, FILM COATED ORAL
Qty: 90 TABLET | Refills: 0 | Status: SHIPPED | OUTPATIENT
Start: 2022-02-25

## 2022-05-25 RX ORDER — ATORVASTATIN CALCIUM 20 MG/1
TABLET, FILM COATED ORAL
Qty: 90 TABLET | Refills: 0 | Status: SHIPPED | OUTPATIENT
Start: 2022-05-25

## 2022-07-26 DIAGNOSIS — G43.709 CHRONIC MIGRAINE WITHOUT AURA WITHOUT STATUS MIGRAINOSUS, NOT INTRACTABLE: ICD-10-CM

## 2022-07-29 RX ORDER — PROPRANOLOL HYDROCHLORIDE 120 MG/1
120 CAPSULE, EXTENDED RELEASE ORAL DAILY
Qty: 30 CAPSULE | Refills: 3 | Status: SHIPPED | OUTPATIENT
Start: 2022-07-29

## 2022-07-29 NOTE — TELEPHONE ENCOUNTER
Pt scheduled appt with Dr. Charline Esquivel for 10.31.22. He has asked for med refill because he is down to his last pill today. He said he is not getting his [employer] messages.

## 2022-08-25 RX ORDER — ATORVASTATIN CALCIUM 20 MG/1
TABLET, FILM COATED ORAL
Qty: 90 TABLET | Refills: 0 | Status: SHIPPED | OUTPATIENT
Start: 2022-08-25

## 2022-09-17 ENCOUNTER — HOSPITAL ENCOUNTER (OUTPATIENT)
Dept: CT IMAGING | Age: 57
Discharge: HOME | End: 2022-09-17
Attending: FAMILY MEDICINE

## 2022-09-17 DIAGNOSIS — Z13.6 SCREENING FOR HEART DISEASE: ICD-10-CM

## 2022-10-31 ENCOUNTER — OFFICE VISIT (OUTPATIENT)
Dept: NEUROLOGY | Facility: CLINIC | Age: 57
End: 2022-10-31
Payer: COMMERCIAL

## 2022-10-31 VITALS
DIASTOLIC BLOOD PRESSURE: 78 MMHG | BODY MASS INDEX: 30 KG/M2 | SYSTOLIC BLOOD PRESSURE: 106 MMHG | RESPIRATION RATE: 18 BRPM | WEIGHT: 230.38 LBS | HEART RATE: 60 BPM

## 2022-10-31 DIAGNOSIS — G43.709 CHRONIC MIGRAINE WITHOUT AURA WITHOUT STATUS MIGRAINOSUS, NOT INTRACTABLE: Primary | ICD-10-CM

## 2022-10-31 PROCEDURE — 3074F SYST BP LT 130 MM HG: CPT | Performed by: OTHER

## 2022-10-31 PROCEDURE — 99213 OFFICE O/P EST LOW 20 MIN: CPT | Performed by: OTHER

## 2022-10-31 PROCEDURE — 3078F DIAST BP <80 MM HG: CPT | Performed by: OTHER

## 2022-10-31 RX ORDER — FLUTICASONE PROPIONATE 50 MCG
1 SPRAY, SUSPENSION (ML) NASAL DAILY
COMMUNITY

## 2022-10-31 RX ORDER — PROPRANOLOL HYDROCHLORIDE 120 MG/1
120 CAPSULE, EXTENDED RELEASE ORAL DAILY
Qty: 90 CAPSULE | Refills: 3 | Status: SHIPPED | OUTPATIENT
Start: 2022-10-31

## 2022-11-25 RX ORDER — ATORVASTATIN CALCIUM 20 MG/1
20 TABLET, FILM COATED ORAL EVERY EVENING
Qty: 90 TABLET | Refills: 0 | Status: SHIPPED | OUTPATIENT
Start: 2022-11-25

## 2022-11-28 ENCOUNTER — TELEPHONE (OUTPATIENT)
Dept: INTERNAL MEDICINE CLINIC | Facility: CLINIC | Age: 57
End: 2022-11-28

## 2022-11-28 DIAGNOSIS — Z00.00 LABORATORY EXAM ORDERED AS PART OF ROUTINE GENERAL MEDICAL EXAMINATION: Primary | ICD-10-CM

## 2022-11-28 NOTE — TELEPHONE ENCOUNTER
Appt scheduled. Future Appointments   Date Time Provider Julieta Kent   12/5/2022  2:30 PM Roxana Sheridan MD [facility] [place]     Patient requesting lab orders.

## 2022-12-03 ENCOUNTER — LAB ENCOUNTER (OUTPATIENT)
Dept: LAB | Age: 57
End: 2022-12-03
Attending: FAMILY MEDICINE
Payer: COMMERCIAL

## 2022-12-03 LAB
ALBUMIN SERPL-MCNC: 3.5 G/DL (ref 3.4–5)
ALBUMIN/GLOB SERPL: 1.1 {RATIO} (ref 1–2)
ALP LIVER SERPL-CCNC: 51 U/L
ALT SERPL-CCNC: 34 U/L
ANION GAP SERPL CALC-SCNC: 6 MMOL/L (ref 0–18)
AST SERPL-CCNC: 16 U/L (ref 15–37)
BASOPHILS # BLD AUTO: 0.02 X10(3) UL (ref 0–0.2)
BASOPHILS NFR BLD AUTO: 0.3 %
BILIRUB SERPL-MCNC: 1.6 MG/DL (ref 0.1–2)
BILIRUB UR QL STRIP.AUTO: NEGATIVE
BUN BLD-MCNC: 17 MG/DL (ref 7–18)
CALCIUM BLD-MCNC: 9 MG/DL (ref 8.5–10.1)
CHLORIDE SERPL-SCNC: 109 MMOL/L (ref 98–112)
CHOLEST SERPL-MCNC: 130 MG/DL (ref ?–200)
CLARITY UR REFRACT.AUTO: CLEAR
CO2 SERPL-SCNC: 26 MMOL/L (ref 21–32)
COLOR UR AUTO: YELLOW
COMPLEXED PSA SERPL-MCNC: 0.46 NG/ML (ref ?–4)
CREAT BLD-MCNC: 1.07 MG/DL
EOSINOPHIL # BLD AUTO: 0.31 X10(3) UL (ref 0–0.7)
EOSINOPHIL NFR BLD AUTO: 4.7 %
ERYTHROCYTE [DISTWIDTH] IN BLOOD BY AUTOMATED COUNT: 12.4 %
EST. AVERAGE GLUCOSE BLD GHB EST-MCNC: 126 MG/DL (ref 68–126)
FASTING PATIENT LIPID ANSWER: YES
FASTING STATUS PATIENT QL REPORTED: YES
GFR SERPLBLD BASED ON 1.73 SQ M-ARVRAT: 80 ML/MIN/1.73M2 (ref 60–?)
GLOBULIN PLAS-MCNC: 3.1 G/DL (ref 2.8–4.4)
GLUCOSE BLD-MCNC: 103 MG/DL (ref 70–99)
GLUCOSE UR STRIP.AUTO-MCNC: NEGATIVE MG/DL
HBA1C MFR BLD: 6 % (ref ?–5.7)
HCT VFR BLD AUTO: 43.3 %
HDLC SERPL-MCNC: 52 MG/DL (ref 40–59)
HGB BLD-MCNC: 13.9 G/DL
HYALINE CASTS #/AREA URNS AUTO: PRESENT /LPF
IMM GRANULOCYTES # BLD AUTO: 0.01 X10(3) UL (ref 0–1)
IMM GRANULOCYTES NFR BLD: 0.2 %
LDLC SERPL CALC-MCNC: 69 MG/DL (ref ?–100)
LEUKOCYTE ESTERASE UR QL STRIP.AUTO: NEGATIVE
LYMPHOCYTES # BLD AUTO: 2.41 X10(3) UL (ref 1–4)
LYMPHOCYTES NFR BLD AUTO: 36.6 %
MCH RBC QN AUTO: 30 PG (ref 26–34)
MCHC RBC AUTO-ENTMCNC: 32.1 G/DL (ref 31–37)
MCV RBC AUTO: 93.3 FL
MONOCYTES # BLD AUTO: 0.57 X10(3) UL (ref 0.1–1)
MONOCYTES NFR BLD AUTO: 8.6 %
NEUTROPHILS # BLD AUTO: 3.27 X10 (3) UL (ref 1.5–7.7)
NEUTROPHILS # BLD AUTO: 3.27 X10(3) UL (ref 1.5–7.7)
NEUTROPHILS NFR BLD AUTO: 49.6 %
NITRITE UR QL STRIP.AUTO: NEGATIVE
NONHDLC SERPL-MCNC: 78 MG/DL (ref ?–130)
OSMOLALITY SERPL CALC.SUM OF ELEC: 294 MOSM/KG (ref 275–295)
PH UR STRIP.AUTO: 5 [PH] (ref 5–8)
PLATELET # BLD AUTO: 202 10(3)UL (ref 150–450)
POTASSIUM SERPL-SCNC: 4.5 MMOL/L (ref 3.5–5.1)
PROT SERPL-MCNC: 6.6 G/DL (ref 6.4–8.2)
PROT UR STRIP.AUTO-MCNC: NEGATIVE MG/DL
RBC # BLD AUTO: 4.64 X10(6)UL
RBC UR QL AUTO: NEGATIVE
SODIUM SERPL-SCNC: 141 MMOL/L (ref 136–145)
SP GR UR STRIP.AUTO: 1.02 (ref 1–1.03)
TRIGL SERPL-MCNC: 32 MG/DL (ref 30–149)
UROBILINOGEN UR STRIP.AUTO-MCNC: 2 MG/DL
VLDLC SERPL CALC-MCNC: 5 MG/DL (ref 0–30)
WBC # BLD AUTO: 6.6 X10(3) UL (ref 4–11)

## 2022-12-03 PROCEDURE — 83036 HEMOGLOBIN GLYCOSYLATED A1C: CPT | Performed by: FAMILY MEDICINE

## 2022-12-03 PROCEDURE — 84153 ASSAY OF PSA TOTAL: CPT | Performed by: FAMILY MEDICINE

## 2022-12-03 PROCEDURE — 80061 LIPID PANEL: CPT | Performed by: FAMILY MEDICINE

## 2022-12-03 PROCEDURE — 80053 COMPREHEN METABOLIC PANEL: CPT | Performed by: FAMILY MEDICINE

## 2022-12-03 PROCEDURE — 81001 URINALYSIS AUTO W/SCOPE: CPT | Performed by: FAMILY MEDICINE

## 2022-12-03 PROCEDURE — 85025 COMPLETE CBC W/AUTO DIFF WBC: CPT | Performed by: FAMILY MEDICINE

## 2022-12-05 ENCOUNTER — OFFICE VISIT (OUTPATIENT)
Dept: INTERNAL MEDICINE CLINIC | Facility: CLINIC | Age: 57
End: 2022-12-05
Payer: COMMERCIAL

## 2022-12-05 VITALS
DIASTOLIC BLOOD PRESSURE: 62 MMHG | HEIGHT: 74 IN | BODY MASS INDEX: 29.62 KG/M2 | OXYGEN SATURATION: 97 % | SYSTOLIC BLOOD PRESSURE: 112 MMHG | HEART RATE: 81 BPM | WEIGHT: 230.81 LBS | TEMPERATURE: 98 F

## 2022-12-05 DIAGNOSIS — Z00.00 LABORATORY EXAM ORDERED AS PART OF ROUTINE GENERAL MEDICAL EXAMINATION: ICD-10-CM

## 2022-12-05 DIAGNOSIS — Z00.00 ROUTINE PHYSICAL EXAMINATION: Primary | ICD-10-CM

## 2022-12-05 PROCEDURE — 3078F DIAST BP <80 MM HG: CPT | Performed by: FAMILY MEDICINE

## 2022-12-05 PROCEDURE — 3074F SYST BP LT 130 MM HG: CPT | Performed by: FAMILY MEDICINE

## 2022-12-05 PROCEDURE — 99396 PREV VISIT EST AGE 40-64: CPT | Performed by: FAMILY MEDICINE

## 2022-12-05 PROCEDURE — 3008F BODY MASS INDEX DOCD: CPT | Performed by: FAMILY MEDICINE

## 2022-12-05 RX ORDER — ATORVASTATIN CALCIUM 20 MG/1
20 TABLET, FILM COATED ORAL EVERY EVENING
Qty: 90 TABLET | Refills: 1 | Status: SHIPPED | OUTPATIENT
Start: 2022-12-05

## 2023-06-26 RX ORDER — ATORVASTATIN CALCIUM 20 MG/1
20 TABLET, FILM COATED ORAL EVERY EVENING
Qty: 90 TABLET | Refills: 1 | Status: SHIPPED | OUTPATIENT
Start: 2023-06-26

## 2023-06-26 NOTE — TELEPHONE ENCOUNTER
Atorvastatin 20 mg  Filled 12-5-22  Qty 90  1 refill  No upcoming appt.   LOV 12-5-22 TO  Labs 12-3-22 Lipid

## 2023-07-20 ENCOUNTER — OFFICE VISIT (OUTPATIENT)
Dept: INTERNAL MEDICINE CLINIC | Facility: CLINIC | Age: 58
End: 2023-07-20
Payer: COMMERCIAL

## 2023-07-20 ENCOUNTER — HOSPITAL ENCOUNTER (OUTPATIENT)
Dept: GENERAL RADIOLOGY | Age: 58
Discharge: HOME | End: 2023-07-20
Attending: FAMILY MEDICINE
Payer: COMMERCIAL

## 2023-07-20 VITALS
OXYGEN SATURATION: 97 % | WEIGHT: 235 LBS | HEIGHT: 74 IN | DIASTOLIC BLOOD PRESSURE: 78 MMHG | BODY MASS INDEX: 30.16 KG/M2 | SYSTOLIC BLOOD PRESSURE: 112 MMHG | TEMPERATURE: 98 F | HEART RATE: 95 BPM

## 2023-07-20 DIAGNOSIS — M25.511 CHRONIC RIGHT SHOULDER PAIN: Primary | ICD-10-CM

## 2023-07-20 DIAGNOSIS — G89.29 CHRONIC RIGHT SHOULDER PAIN: ICD-10-CM

## 2023-07-20 DIAGNOSIS — G89.29 CHRONIC RIGHT SHOULDER PAIN: Primary | ICD-10-CM

## 2023-07-20 DIAGNOSIS — M25.511 CHRONIC RIGHT SHOULDER PAIN: ICD-10-CM

## 2023-07-20 PROCEDURE — 73030 X-RAY EXAM OF SHOULDER: CPT | Performed by: FAMILY MEDICINE

## 2023-07-20 PROCEDURE — 3078F DIAST BP <80 MM HG: CPT | Performed by: FAMILY MEDICINE

## 2023-07-20 PROCEDURE — 3008F BODY MASS INDEX DOCD: CPT | Performed by: FAMILY MEDICINE

## 2023-07-20 PROCEDURE — 3074F SYST BP LT 130 MM HG: CPT | Performed by: FAMILY MEDICINE

## 2023-07-20 PROCEDURE — 99213 OFFICE O/P EST LOW 20 MIN: CPT | Performed by: FAMILY MEDICINE

## 2023-07-21 ENCOUNTER — TELEPHONE (OUTPATIENT)
Dept: ORTHOPEDICS CLINIC | Facility: CLINIC | Age: 58
End: 2023-07-21

## 2023-07-21 NOTE — TELEPHONE ENCOUNTER
Last Imaging  XR SHOULDER, COMPLETE (MIN 2 VIEWS), RIGHT (CPT=73030)  Narrative: PROCEDURE:  XR SHOULDER, COMPLETE (MIN 2 VIEWS), RIGHT (CPT=73030)     TECHNIQUE:  Multiple views were obtained. COMPARISON:  None. INDICATIONS:  G89.29 Chronic right shoulder pain M25.511 Chronic right shoulder pain     PATIENT STATED HISTORY: (As transcribed by Technologist)  Right shoulder pain. No trauma. FINDINGS:  Negative for fracture, dislocation, deformity or other acute bony abnormality. Spurring along the undersurface of the acromion. No soft tissue abnormalities. Impression: CONCLUSION:  Spurring along the undersurface of the acromion. Please correlate for any potential signs or symptoms of shoulder impingement. No acute fracture or other acute bony process.      LOCATION:  [facility]        Dictated by (CST): Ansley Valdes MD on 7/20/2023 at 1:52 PM       Finalized by (CST): Ansley Valdes MD on 7/20/2023 at 1:52 PM          Future Appointments   Date Time Provider Julieta Carli   8/10/2023  3:40 PM Nessa Harris MD [facility] [id number]   10/30/2023 11:50 AM MD SONNY Naik [facility]

## 2023-07-21 NOTE — TELEPHONE ENCOUNTER
Imaging was completed for this patient for a RT SHOULDER PAIN, but it needs to be reviewed to see if additional imaging is needed. If so, please enter the appropriate RX and let the patient know to come in before their appointment to complete the additional imaging. Thank you!     Future Appointments   Date Time Provider Julieta Kent   8/10/2023  3:40 PM Barbra Thomas MD [facility]faro [id number]   10/30/2023 11:50 AM MD SONNY Corado [facility]

## 2023-08-10 ENCOUNTER — OFFICE VISIT (OUTPATIENT)
Dept: ORTHOPEDICS CLINIC | Facility: CLINIC | Age: 58
End: 2023-08-10
Payer: COMMERCIAL

## 2023-08-10 VITALS — BODY MASS INDEX: 30.16 KG/M2 | WEIGHT: 235 LBS | HEIGHT: 74 IN

## 2023-08-10 DIAGNOSIS — M75.81 TENDINITIS OF RIGHT ROTATOR CUFF: ICD-10-CM

## 2023-08-10 DIAGNOSIS — M75.41 IMPINGEMENT SYNDROME OF RIGHT SHOULDER: Primary | ICD-10-CM

## 2023-08-10 PROCEDURE — 3008F BODY MASS INDEX DOCD: CPT | Performed by: ORTHOPAEDIC SURGERY

## 2023-08-10 PROCEDURE — 99244 OFF/OP CNSLTJ NEW/EST MOD 40: CPT | Performed by: ORTHOPAEDIC SURGERY

## 2023-08-10 PROCEDURE — 20610 DRAIN/INJ JOINT/BURSA W/O US: CPT | Performed by: ORTHOPAEDIC SURGERY

## 2023-08-10 RX ORDER — TRIAMCINOLONE ACETONIDE 40 MG/ML
40 INJECTION, SUSPENSION INTRA-ARTICULAR; INTRAMUSCULAR ONCE
Status: COMPLETED | OUTPATIENT
Start: 2023-08-10 | End: 2023-08-10

## 2023-08-10 RX ADMIN — TRIAMCINOLONE ACETONIDE 40 MG: 40 INJECTION, SUSPENSION INTRA-ARTICULAR; INTRAMUSCULAR at 16:40:00

## 2023-08-13 ENCOUNTER — TELEPHONE (OUTPATIENT)
Dept: ORTHOPEDICS CLINIC | Facility: CLINIC | Age: 58
End: 2023-08-13

## 2023-08-18 ENCOUNTER — TELEPHONE (OUTPATIENT)
Dept: ORTHOPEDICS CLINIC | Facility: CLINIC | Age: 58
End: 2023-08-18

## 2023-08-18 DIAGNOSIS — M75.41 IMPINGEMENT SYNDROME OF RIGHT SHOULDER: Primary | ICD-10-CM

## 2023-08-18 NOTE — TELEPHONE ENCOUNTER
Patients wife called asking if Dr. Jayne Contreras would be able to put an order in for Michael to get an MRI before his appt on 9/13. Please advise. Patient can be reached at [phone].

## 2023-08-18 NOTE — TELEPHONE ENCOUNTER
08-10-23 LOV  note: \"If symptoms do not improve over the next 2 to 4 weeks, reassessment is advised for possible advanced imaging with an MRI. \"    Impingement syndrome of right shoulder       Pt requesting MRI order. Order pending.

## 2023-08-21 NOTE — TELEPHONE ENCOUNTER
Called patient and wife. No answer. Message left with central scheduling phone number and office number, order has been placed.

## 2023-09-12 ENCOUNTER — HOSPITAL ENCOUNTER (OUTPATIENT)
Dept: MRI IMAGING | Age: 58
Discharge: HOME | End: 2023-09-12
Attending: ORTHOPAEDIC SURGERY
Payer: COMMERCIAL

## 2023-09-12 DIAGNOSIS — M75.41 IMPINGEMENT SYNDROME OF RIGHT SHOULDER: ICD-10-CM

## 2023-09-12 PROCEDURE — 73221 MRI JOINT UPR EXTREM W/O DYE: CPT | Performed by: ORTHOPAEDIC SURGERY

## 2023-09-13 ENCOUNTER — OFFICE VISIT (OUTPATIENT)
Dept: ORTHOPEDICS CLINIC | Facility: CLINIC | Age: 58
End: 2023-09-13
Payer: OTHER MISCELLANEOUS

## 2023-09-13 VITALS — HEIGHT: 74 IN | WEIGHT: 235 LBS | BODY MASS INDEX: 30.16 KG/M2

## 2023-09-13 DIAGNOSIS — M75.41 IMPINGEMENT SYNDROME OF RIGHT SHOULDER: Primary | ICD-10-CM

## 2023-09-13 DIAGNOSIS — M19.011 ARTHRITIS OF RIGHT ACROMIOCLAVICULAR JOINT: ICD-10-CM

## 2023-09-13 PROCEDURE — 3008F BODY MASS INDEX DOCD: CPT | Performed by: ORTHOPAEDIC SURGERY

## 2023-09-13 PROCEDURE — 99214 OFFICE O/P EST MOD 30 MIN: CPT | Performed by: ORTHOPAEDIC SURGERY

## 2023-09-15 ENCOUNTER — TELEPHONE (OUTPATIENT)
Dept: PHYSICAL THERAPY | Facility: HOSPITAL | Age: 58
End: 2023-09-15

## 2023-09-21 ENCOUNTER — TELEPHONE (OUTPATIENT)
Dept: ORTHOPEDICS CLINIC | Facility: CLINIC | Age: 58
End: 2023-09-21

## 2023-09-21 ENCOUNTER — TELEPHONE (OUTPATIENT)
Dept: PHYSICAL THERAPY | Facility: HOSPITAL | Age: 58
End: 2023-09-21

## 2023-09-21 NOTE — TELEPHONE ENCOUNTER
Future Appointments   Date Time Provider Julieta Yoli   10/26/2023  8:20 AM Camila Rey MD [facility]reras [id number]       Merged with [facility] to reschedule his appt w/ Dr. Carlos Valle because he'll be in surgery.

## 2023-09-22 ENCOUNTER — APPOINTMENT (OUTPATIENT)
Dept: PHYSICAL THERAPY | Age: 58
End: 2023-09-22
Attending: PHYSICIAN ASSISTANT
Payer: OTHER MISCELLANEOUS

## 2023-09-25 ENCOUNTER — TELEPHONE (OUTPATIENT)
Dept: ORTHOPEDICS CLINIC | Facility: CLINIC | Age: 58
End: 2023-09-25

## 2023-09-26 ENCOUNTER — TELEPHONE (OUTPATIENT)
Dept: PHYSICAL THERAPY | Facility: HOSPITAL | Age: 58
End: 2023-09-26

## 2023-09-29 ENCOUNTER — OFFICE VISIT (OUTPATIENT)
Dept: PHYSICAL THERAPY | Age: 58
End: 2023-09-29
Attending: PHYSICIAN ASSISTANT
Payer: OTHER MISCELLANEOUS

## 2023-09-29 DIAGNOSIS — M75.41 IMPINGEMENT SYNDROME OF RIGHT SHOULDER: Primary | ICD-10-CM

## 2023-09-29 PROCEDURE — 97110 THERAPEUTIC EXERCISES: CPT

## 2023-09-29 PROCEDURE — 97161 PT EVAL LOW COMPLEX 20 MIN: CPT

## 2023-10-04 ENCOUNTER — TELEPHONE (OUTPATIENT)
Dept: PHYSICAL THERAPY | Age: 58
End: 2023-10-04

## 2023-10-04 ENCOUNTER — OFFICE VISIT (OUTPATIENT)
Dept: PHYSICAL THERAPY | Age: 58
End: 2023-10-04
Attending: PHYSICIAN ASSISTANT
Payer: OTHER MISCELLANEOUS

## 2023-10-04 PROCEDURE — 97110 THERAPEUTIC EXERCISES: CPT

## 2023-10-04 PROCEDURE — 97140 MANUAL THERAPY 1/> REGIONS: CPT

## 2023-10-04 NOTE — PROGRESS NOTES
Diagnosis:   Impingement syndrome of right shoulder (M75.41)           Referring Provider: Fidel Epstein  Date of Evaluation:    9/29/2023     Precautions:    Light duty at work for next 5 wks; No lifting,pushing, pulling, carrying >10# or reaching overhead for next 5 weeks  1 week as of 10/6 Next MD visit:   none scheduled  Date of Surgery: n/a   Insurance Primary/Secondary: WORKERS COMP / N/A     # Auth Visits: 8            Subjective: Patient reports feeling about the same, he has only done his HEP once since initial eval.     Pain: 4/10      Objective:  Pulled from initial eval     MRI RESULTS (9/12/23)  CONCLUSION:  Mild supraspinatus tendinopathy with mild to moderate impingement. Observation/Posture: Protracted shoulders. Palpation: TTP R LHB tendon  Sensation: WNL  Cervical Screen: WNL     ROM: (* denotes performed with pain)  Shoulder  Elbow   Flexion: R 121*; L 180  Abduction: R 150*; L 165  ER: R 95; L 93  IR: R 80*; L 85 Flexion: R WNL; L WNL  Extension: R WNL; L WNL         Accessory motion: NT     Flexibility: B pec tightness      Strength/MMT: (* denotes performed with pain)  Shoulder Elbow Scapular   Flexion: R 4/5*; L 5/5  Abduction: R 4/5*; L 5/5  ER: R 4/5; L 5/5  IR: R 4/5*; L 5/5 Flexion: R 4+/5*; L 5/5  Extension: R 4+/5*; L 5/5    Rhomboids: R 3+/5, L 4/5  Mid trap: R 3+/5; L 4/5   Low trap: R 3+/5; L 4/5      Special tests:      Active Impingement: (-) on L, (+) on R for pain  Tish Jeremias: (-) on L, (+) on R for pain  Neers: (-) on L, (+) on R for pain  O'Briens: (-) on L, (+) on R for pain  Full Can: (-) on L, (-) on R  Empty Can: (-) on L, (-) on R      Assessment: Patient was able to tolerate today's tx well with minimal pain or discomfort. Was initiated on RTC strengthening below the shoulder height and will be assessed on subsequent visit for tolerance to new HEP.        Goals:   (to be met in 8 visits)   Pt will improve shoulder flexion AROM to >170 degrees to be able to reach into overhead cabinets without pain or restriction   Pt will improve shoulder abduction AROM to >160 degrees to improve ability to don deodorant, don/doff shirts, and wash hair   Pt will increase shoulder AROM IR to 85 to be able to reach in back pocket, tuck in shirt, and turn steering wheel without pain  Pt will improve shoulder strength throughout to 5/5 to improve function with lifting/carrying/pushing/pulling at work   Pt will demonstrate increased mid/low trap strength to 4/5 to promote improved shoulder mechanics and stabilization with lifting and reaching   Pt will be independent and compliant with comprehensive HEP to maintain progress achieved in PT   Pt will report 1/10 paint at worst with activity  Pt will be able to return to work at full duty w/o restrictions    Plan: Assess tolerance to new exercises and updated HEP  Date: 10/4/2023  TX#: 2/8 Date:                 TX#: 3/ Date:                 TX#: 4/ Date:                 TX#: 5/ Date: Tx#: 6/   Manual ther: 15min  Joint mobs: AP/distal R shoulder       TherEx: 30min  Supine ER YTB 2 x 12  Standing row red handle band 2 x 15  Shoulder ext YTB standing 2 x 15  Standing blue handle band ER 2 x 15  Standing chest press ER iso YTB 2 x 15                       HEP:  BID: R prone - I's, T's, rows - 0# x20. Access Code: [email or web address]  URL: [email or web address]  Date: 10/04/2023  Prepared by: Junior Pruitt    Exercises  - Standing Row with Anchored Resistance  - 2 x daily - 7 x weekly - 2 sets - 15 reps - 2 hold  - Shoulder External Rotation and Scapular Retraction with Resistance  - 2 x daily - 7 x weekly - 2 sets - 15 reps - 2 hold  - Shoulder External Rotation with Anchored Resistance  - 2 x daily - 7 x weekly - 2 sets - 15 reps - 3 hold  - Shoulder extension with resistance - Neutral  - 2 x daily - 7 x weekly - 2 sets - 15 reps - 3 hold  Charges: 1 manual, 2 ther ex       Total Timed Treatment: 45 min  Total Treatment Time: 45 min

## 2023-10-06 ENCOUNTER — APPOINTMENT (OUTPATIENT)
Dept: PHYSICAL THERAPY | Age: 58
End: 2023-10-06
Attending: PHYSICIAN ASSISTANT
Payer: OTHER MISCELLANEOUS

## 2023-10-09 ENCOUNTER — TELEPHONE (OUTPATIENT)
Dept: PHYSICAL THERAPY | Facility: HOSPITAL | Age: 58
End: 2023-10-09

## 2023-10-10 NOTE — TELEPHONE ENCOUNTER
Request received for 9/25/23. Faxed notes to workers comp  at [phone]. Received fax confirmation.

## 2023-10-13 ENCOUNTER — OFFICE VISIT (OUTPATIENT)
Dept: PHYSICAL THERAPY | Age: 58
End: 2023-10-13
Attending: PHYSICIAN ASSISTANT
Payer: OTHER MISCELLANEOUS

## 2023-10-13 PROCEDURE — 97110 THERAPEUTIC EXERCISES: CPT

## 2023-10-13 NOTE — PROGRESS NOTES
Diagnosis:   Impingement syndrome of right shoulder (M75.41)           Referring Provider: Inocencio Thomas  Date of Evaluation:    9/29/2023     Precautions:    Light duty at work for next 5 wks; No lifting,pushing, pulling, carrying >10# or reaching overhead for next 5 weeks  1 week as of 10/6 Next MD visit:   none scheduled  Date of Surgery: n/a   Insurance Primary/Secondary: WORKERS COMP / N/A     # Auth Visits: 8            Subjective: Patient arrives to PT today and states that his shoulder is hurting today. He states he has been doing his part time job for ~3 wks on light duty. He states it can be difficult to work within the restrictions in place d/t his job duties. Pain:   7/10      Objective:  Pulled from initial eval     MRI RESULTS (9/12/23)  CONCLUSION:  Mild supraspinatus tendinopathy with mild to moderate impingement. Observation/Posture: Protracted shoulders. Palpation: TTP R LHB tendon  Sensation: WNL  Cervical Screen: WNL     ROM: (* denotes performed with pain)  Shoulder  Elbow   Flexion: R 121*; L 180  Abduction: R 150*; L 165  ER: R 95; L 93  IR: R 80*; L 85 Flexion: R WNL; L WNL  Extension: R WNL; L WNL         Accessory motion: NT     Flexibility: B pec tightness      Strength/MMT: (* denotes performed with pain)  Shoulder Elbow Scapular   Flexion: R 4/5*; L 5/5  Abduction: R 4/5*; L 5/5  ER: R 4/5; L 5/5  IR: R 4/5*; L 5/5 Flexion: R 4+/5*; L 5/5  Extension: R 4+/5*; L 5/5    Rhomboids: R 3+/5, L 4/5  Mid trap: R 3+/5; L 4/5   Low trap: R 3+/5; L 4/5      Special tests:      Active Impingement: (-) on L, (+) on R for pain  Edwin Dylon: (-) on L, (+) on R for pain  Neers: (-) on L, (+) on R for pain  O'Briens: (-) on L, (+) on R for pain  Full Can: (-) on L, (-) on R  Empty Can: (-) on L, (-) on R      Assessment:   Pt tolerated PT tx well today. Initiated session w/ UBE warmup. Increased resistance w/ prone I, T, row for improved posterior shoulder and periscapular strength.   Carla Morris fasciculations present indicating impaired strength and neuromuscular control. Added SL ER w/ light dumbbell for improved posterior cuff strength. Progressed strengthening exercises as tolerated. Goals:   (to be met in 8 visits)   Pt will improve shoulder flexion AROM to >170 degrees to be able to reach into overhead cabinets without pain or restriction   Pt will improve shoulder abduction AROM to >160 degrees to improve ability to don deodorant, don/doff shirts, and wash hair   Pt will increase shoulder AROM IR to 85 to be able to reach in back pocket, tuck in shirt, and turn steering wheel without pain  Pt will improve shoulder strength throughout to 5/5 to improve function with lifting/carrying/pushing/pulling at work   Pt will demonstrate increased mid/low trap strength to 4/5 to promote improved shoulder mechanics and stabilization with lifting and reaching   Pt will be independent and compliant with comprehensive HEP to maintain progress achieved in PT   Pt will report 1/10 paint at worst with activity  Pt will be able to return to work at full duty w/o restrictions    Plan: Next visit consider -   Date: 10/4/2023  TX#: 2/8 Date:   10/13/23              TX#: 3/8 Date:                 TX#: 4/8 Date:                 TX#: 5/8 Date: Tx#: 6/8   Manual ther: 15min  Joint mobs: AP/distal R shoulder              TherEx: 30min  Supine ER YTB 2 x 12  Standing row red handle band 2 x 15  Shoulder ext YTB standing 2 x 15  Standing blue handle band ER 2 x 15  Standing chest press ER iso YTB 2 x 15   UBE S11 - L1 - 2'/2'  R prone - I's, T's, rows - 1# x20  R SL ER 1# x20  RTB B ext x20  BTB B rows x20  R unilat ER/IR - single RTB x20  YTB B ER x20  YTB B horz abd x20                    HEP:  BID: R prone - I's, T's, rows - 0# x20. Access Code: [email or web address]  URL: [email or web address]  Date: 10/04/2023  Prepared by: Narayan Enrique    Exercises  - Standing Row with Anchored Resistance  - 2 x daily - 7 x weekly - 2 sets - 15 reps - 2 hold  - Shoulder External Rotation and Scapular Retraction with Resistance  - 2 x daily - 7 x weekly - 2 sets - 15 reps - 2 hold  - Shoulder External Rotation with Anchored Resistance  - 2 x daily - 7 x weekly - 2 sets - 15 reps - 3 hold  - Shoulder extension with resistance - Neutral  - 2 x daily - 7 x weekly - 2 sets - 15 reps - 3 hold  Charges: 3 therex    Total Timed Treatment: 42 min  Total Treatment Time: 42 min

## 2023-10-17 ENCOUNTER — OFFICE VISIT (OUTPATIENT)
Dept: PHYSICAL THERAPY | Age: 58
End: 2023-10-17
Attending: PHYSICIAN ASSISTANT
Payer: OTHER MISCELLANEOUS

## 2023-10-17 PROCEDURE — 97110 THERAPEUTIC EXERCISES: CPT

## 2023-10-17 NOTE — PROGRESS NOTES
Diagnosis:   Impingement syndrome of right shoulder (M75.41)           Referring Provider: Camila Stone  Date of Evaluation:    9/29/2023     Precautions:    Light duty at work for next 5 wks; No lifting,pushing, pulling, carrying >10# or reaching overhead for next 5 weeks  1 week as of 10/6 Next MD visit:   none scheduled  Date of Surgery: n/a   Insurance Primary/Secondary: WORKERS COMP / N/A     # Auth Visits: 8            Subjective: Patient reports feeling well, with no pain as of today. He worked 1 day this past week. Pain:   0/10      Objective:  Pulled from initial eval     MRI RESULTS (9/12/23)  CONCLUSION:  Mild supraspinatus tendinopathy with mild to moderate impingement. Observation/Posture: Protracted shoulders. Palpation: TTP R LHB tendon  Sensation: WNL  Cervical Screen: WNL     ROM: (* denotes performed with pain)  Shoulder  Elbow   Flexion: R 121*; L 180  Abduction: R 150*; L 165  ER: R 95; L 93  IR: R 80*; L 85 Flexion: R WNL; L WNL  Extension: R WNL; L WNL         Accessory motion: NT     Flexibility: B pec tightness      Strength/MMT: (* denotes performed with pain)  Shoulder Elbow Scapular   Flexion: R 4/5*; L 5/5  Abduction: R 4/5*; L 5/5  ER: R 4/5; L 5/5  IR: R 4/5*; L 5/5 Flexion: R 4+/5*; L 5/5  Extension: R 4+/5*; L 5/5    Rhomboids: R 3+/5, L 4/5  Mid trap: R 3+/5; L 4/5   Low trap: R 3+/5; L 4/5      Special tests:      Active Impingement: (-) on L, (+) on R for pain  Mahad Montes De Oca: (-) on L, (+) on R for pain  Neers: (-) on L, (+) on R for pain  O'Briens: (-) on L, (+) on R for pain  Full Can: (-) on L, (-) on R  Empty Can: (-) on L, (-) on R      Assessment:   Pt was able to tolerate today's tx well with minimal pain and discomfort. Was progressed with higher resistances to continue strengthening of the R RTC.          Goals:   (to be met in 8 visits)   Pt will improve shoulder flexion AROM to >170 degrees to be able to reach into overhead cabinets without pain or restriction Pt will improve shoulder abduction AROM to >160 degrees to improve ability to don deodorant, don/doff shirts, and wash hair   Pt will increase shoulder AROM IR to 85 to be able to reach in back pocket, tuck in shirt, and turn steering wheel without pain  Pt will improve shoulder strength throughout to 5/5 to improve function with lifting/carrying/pushing/pulling at work   Pt will demonstrate increased mid/low trap strength to 4/5 to promote improved shoulder mechanics and stabilization with lifting and reaching   Pt will be independent and compliant with comprehensive HEP to maintain progress achieved in PT   Pt will report 1/10 paint at worst with activity  Pt will be able to return to work at full duty w/o restrictions    Plan: Next visit consider - flutters to overhead  Date: 10/4/2023  TX#: 2/8 Date:   10/13/23              TX#: 3/8 Date: 10/17/23                TX#: 4/8 Date:                 TX#: 5/8 Date: Tx#: 6/8   Manual ther: 15min  Joint mobs: AP/distal R shoulder              TherEx: 30min  Supine ER YTB 2 x 12  Standing row red handle band 2 x 15  Shoulder ext YTB standing 2 x 15  Standing blue handle band ER 2 x 15  Standing chest press ER iso YTB 2 x 15   UBE S11 - L1 - 2'/2'  R prone - I's, T's, rows - 1# x20  R SL ER 1# x20  RTB B ext x20  BTB B rows x20  R unilat ER/IR - single RTB x20  YTB B ER x20  YTB B horz abd x20 UBE S11 - L1 - 2'/2'  R prone - I's, T's rows  2lb x 20  Standing shoulder ext RTB 2 x 20  B/L ER RTB x 20  B/L HABD RTB x 20  YTB ER iso chest press x 15  YTB steering wheels x 10/side  Blue handle band ER iso walkouts x 20  Unilateral blue handle band ER in half kneel x 20                         HEP:  BID: R prone - I's, T's, rows - 0# x20. Access Code: [email or web address]  URL: [email or web address]. com/  Date: 10/04/2023  Prepared by: Elizabeth Yang    Exercises  - Standing Row with Anchored Resistance  - 2 x daily - 7 x weekly - 2 sets - 15 reps - 2 hold  - Shoulder External Rotation and Scapular Retraction with Resistance  - 2 x daily - 7 x weekly - 2 sets - 15 reps - 2 hold  - Shoulder External Rotation with Anchored Resistance  - 2 x daily - 7 x weekly - 2 sets - 15 reps - 3 hold  - Shoulder extension with resistance - Neutral  - 2 x daily - 7 x weekly - 2 sets - 15 reps - 3 hold  Charges: 3 therex    Total Timed Treatment: 40 min  Total Treatment Time: 40 min

## 2023-10-19 ENCOUNTER — OFFICE VISIT (OUTPATIENT)
Dept: PHYSICAL THERAPY | Age: 58
End: 2023-10-19
Attending: PHYSICIAN ASSISTANT
Payer: OTHER MISCELLANEOUS

## 2023-10-19 PROCEDURE — 97140 MANUAL THERAPY 1/> REGIONS: CPT

## 2023-10-19 PROCEDURE — 97110 THERAPEUTIC EXERCISES: CPT

## 2023-10-19 NOTE — PROGRESS NOTES
Diagnosis:   Impingement syndrome of right shoulder (M75.41)           Referring Provider: Lyly Vergara  Date of Evaluation:    9/29/2023     Precautions:    Light duty at work for next 5 wks; No lifting,pushing, pulling, carrying >10# or reaching overhead for next 5 weeks  1 week as of 10/6 Next MD visit:   none scheduled  Date of Surgery: n/a   Insurance Primary/Secondary: WORKERS COMP / N/A     # Auth Visits: 8            Subjective: Patient reports feeling well, just some soreness in the muscles from previous visit. Pain:   0/10      Objective:  Pulled from initial eval     MRI RESULTS (9/12/23)  CONCLUSION:  Mild supraspinatus tendinopathy with mild to moderate impingement. Observation/Posture: Protracted shoulders. Palpation: TTP R LHB tendon  Sensation: WNL  Cervical Screen: WNL     ROM: (* denotes performed with pain)  Shoulder  Elbow   Flexion: R 121*; L 180  Abduction: R 150*; L 165  ER: R 95; L 93  IR: R 80*; L 85 Flexion: R WNL; L WNL  Extension: R WNL; L WNL         Accessory motion: NT     Flexibility: B pec tightness      Strength/MMT: (* denotes performed with pain)  Shoulder Elbow Scapular   Flexion: R 4/5*; L 5/5  Abduction: R 4/5*; L 5/5  ER: R 4/5; L 5/5  IR: R 4/5*; L 5/5 Flexion: R 4+/5*; L 5/5  Extension: R 4+/5*; L 5/5    Rhomboids: R 3+/5, L 4/5  Mid trap: R 3+/5; L 4/5   Low trap: R 3+/5; L 4/5      Special tests:      Active Impingement: (-) on L, (+) on R for pain  Nicholaus Pert: (-) on L, (+) on R for pain  Neers: (-) on L, (+) on R for pain  O'Briens: (-) on L, (+) on R for pain  Full Can: (-) on L, (-) on R  Empty Can: (-) on L, (-) on R      Assessment:   Pt was able to tolerate today's tx well with minimal pain and discomfort in the R shoulder. Was progressed with increased rep ranges and resistances.          Goals:   (to be met in 8 visits)   Pt will improve shoulder flexion AROM to >170 degrees to be able to reach into overhead cabinets without pain or restriction   Pt will improve shoulder abduction AROM to >160 degrees to improve ability to don deodorant, don/doff shirts, and wash hair   Pt will increase shoulder AROM IR to 85 to be able to reach in back pocket, tuck in shirt, and turn steering wheel without pain  Pt will improve shoulder strength throughout to 5/5 to improve function with lifting/carrying/pushing/pulling at work   Pt will demonstrate increased mid/low trap strength to 4/5 to promote improved shoulder mechanics and stabilization with lifting and reaching   Pt will be independent and compliant with comprehensive HEP to maintain progress achieved in PT   Pt will report 1/10 paint at worst with activity  Pt will be able to return to work at full duty w/o restrictions    Plan: Next visit consider - er at 90 deg shoulder abd  Date: 10/4/2023  TX#: 2/8 Date:   10/13/23              TX#: 3/8 Date: 10/17/23                TX#: 4/8 Date:  10/19/23               TX#: 5/8 Date:    Tx#: 6/8  Re-assess for work comp - send to [facility] pool   Manual ther: 15min  Joint mobs: AP/distal R shoulder   Manual ther: 10  Joint mobs: AP/distal R shoulder           TherEx: 30min  Supine ER YTB 2 x 12  Standing row red handle band 2 x 15  Shoulder ext YTB standing 2 x 15  Standing blue handle band ER 2 x 15  Standing chest press ER iso YTB 2 x 15   UBE S11 - L1 - 2'/2'  R prone - I's, T's, rows - 1# x20  R SL ER 1# x20  RTB B ext x20  BTB B rows x20  R unilat ER/IR - single RTB x20  YTB B ER x20  YTB B horz abd x20 UBE S11 - L1 - 2'/2'  R prone - I's, T's rows  2lb x 20  Standing shoulder ext RTB 2 x 20  B/L ER RTB x 20  B/L HABD RTB x 20  YTB ER iso chest press x 15  YTB steering wheels x 10/side  Blue handle band ER iso walkouts x 20  Unilateral blue handle band ER in half kneel x 20       TherEx: 35min  UBE S11 - -L1 - 2min/2min  Standing shoulder flex AAROM w/ hugh x 20  Standing shoulder ext w/ hugh x 20  Standing shoulder flex 1lb x 20  Standing shoulder abd 1lb x 20  Standing B shoulder ER RTB 2 x 15  Standing ER iso shoulder OHP 2 x 10  Standing unilateral ER iso chest press x 15                       HEP:  BID: R prone - I's, T's, rows - 0# x20. Access Code: [email or web address]  URL: [email or web address]  Date: 10/04/2023  Prepared by: Maricel Bodily    Exercises  - Standing Row with Anchored Resistance  - 2 x daily - 7 x weekly - 2 sets - 15 reps - 2 hold  - Shoulder External Rotation and Scapular Retraction with Resistance  - 2 x daily - 7 x weekly - 2 sets - 15 reps - 2 hold  - Shoulder External Rotation with Anchored Resistance  - 2 x daily - 7 x weekly - 2 sets - 15 reps - 3 hold  - Shoulder extension with resistance - Neutral  - 2 x daily - 7 x weekly - 2 sets - 15 reps - 3 hold  Charges: 2 therex, 1 manaul ther    Total Timed Treatment: 40 min  Total Treatment Time: 40 min

## 2023-10-23 ENCOUNTER — OFFICE VISIT (OUTPATIENT)
Dept: PHYSICAL THERAPY | Age: 58
End: 2023-10-23
Attending: PHYSICIAN ASSISTANT
Payer: OTHER MISCELLANEOUS

## 2023-10-23 PROCEDURE — 97110 THERAPEUTIC EXERCISES: CPT

## 2023-10-23 NOTE — PROGRESS NOTES
Progress Summary  Pt has attended 6 visits in Physical Therapy. Diagnosis:   Impingement syndrome of right shoulder (M75.41)           Referring Provider: Fidel Epstein  Date of Evaluation:    9/29/2023     Precautions:    Light duty at work for next 5 wks; No lifting,pushing, pulling, carrying >10# or reaching overhead for next 5 weeks   week as of 10/6 Next MD visit:   none scheduled  Date of Surgery: n/a   Insurance Primary/Secondary: WORKERS COMP / N/A     # Auth Visits: 8            Subjective: Patient reports that he has been ok so far, worked over the weekend and was a bit sore from that but came in to today's session feeling much better. Pain:   0/10      Objective:     Observation/Posture: Protracted shoulders. 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 Functional limitations included reaching overhead, behind back, behind head; with lifting and carrying, with pulling. Objective measurements found impaired posture, point tenderness at anterior shoulder, impaired R shoulder ROM, impaired R shoulder strength, (+) impingement tests, decreased function. After 6 visits of skilled PT, patient has seen drastically improved shoulder ROM and strength.  Additionally, patient reports that he is fully capable of everything required of him during his current work duties w/o pain as well as his other ADLs. Only small limitations include minor pain when reaching behind as well as end range shoulder flex/abd. Patient is on track to make an acceptable recovery and return to full work after his current work precaution cycle ends on 11/3/23. If no other complications arise, patient should be set to be d/c on the final (8th visit) and will be given an updated HEP to continue his shoulder strengthening to prevent further injury and be completely functional during his ADLs.        Goals:   (to be met in 8 visits)   Pt will improve shoulder flexion AROM to >170 degrees to be able to reach into overhead cabinets without pain or restriction   Pt will improve shoulder abduction AROM to >160 degrees to improve ability to don deodorant, don/doff shirts, and wash hair   Pt will increase shoulder AROM IR to 85 to be able to reach in back pocket, tuck in shirt, and turn steering wheel without pain  Pt will improve shoulder strength throughout to 5/5 to improve function with lifting/carrying/pushing/pulling at work   Pt will demonstrate increased mid/low trap strength to 4/5 to promote improved shoulder mechanics and stabilization with lifting and reaching   Pt will be independent and compliant with comprehensive HEP to maintain progress achieved in PT   Pt will report 1/10 paint at worst with activity  Pt will be able to return to work at full duty w/o restrictions    Date: 10/4/2023  TX#: 2/8 Date:   10/13/23              TX#: 3/8 Date: 10/17/23                TX#: 4/8 Date:  10/19/23               TX#: 5/8 Date: Date 10/23/23  Tx#: 6/8  Re-assess for work comp - send to [facility]   Manual ther: 15min  Joint mobs: AP/distal R shoulder   Manual ther: 10  Joint mobs: AP/distal R shoulder           TherEx: 30min  Supine ER YTB 2 x 12  Standing row red handle band 2 x 15  Shoulder ext YTB standing 2 x 15  Standing blue handle band ER 2 x 15  Standing chest press ER iso YTB 2 x 15   UBE S11 - L1 - 2'/2'  R prone - I's, T's, rows - 1# x20  R SL ER 1# x20  RTB B ext x20  BTB B rows x20  R unilat ER/IR - single RTB x20  YTB B ER x20  YTB B horz abd x20 UBE S11 - L1 - 2'/2'  R prone - I's, T's rows  2lb x 20  Standing shoulder ext RTB 2 x 20  B/L ER RTB x 20  B/L HABD RTB x 20  YTB ER iso chest press x 15  YTB steering wheels x 10/side  Blue handle band ER iso walkouts x 20  Unilateral blue handle band ER in half kneel x 20       TherEx: 35min  UBE S11 - -L1 - 2min/2min  Standing shoulder flex AAROM w/ hugh x 20  Standing shoulder ext w/ hugh x 20  Standing shoulder flex 1lb x 20  Standing shoulder abd 1lb x 20  Standing B shoulder ER RTB 2 x 15  Standing ER iso shoulder OHP 2 x 10  Standing unilateral ER iso chest press x 15      TherEx: 35min  UBE S11 - -L1 - 2min/2min  Reassessment   Standing shoulder flex 2lb x 20  Standing shoulder abd 2lb x 20  Standing B/L shoulder ER RTB/GTB 2 x 20  RTB ER iso shoulder raise x 20                     HEP:  BID: R prone - I's, T's, rows - 0# x20. Access Code: [email or web address]   URL: [email or web address]  Date: 10/04/2023  Prepared by: Alisha Jones    Exercises  - Standing Row with Anchored Resistance  - 2 x daily - 7 x weekly - 2 sets - 15 reps - 2 hold  - Shoulder External Rotation and Scapular Retraction with Resistance  - 2 x daily - 7 x weekly - 2 sets - 15 reps - 2 hold  - Shoulder External Rotation with Anchored Resistance  - 2 x daily - 7 x weekly - 2 sets - 15 reps - 3 hold  - Shoulder extension with resistance - Neutral  - 2 x daily - 7 x weekly - 2 sets - 15 reps - 3 hold  Charges: 3 TherEx  Total Timed Treatment: 45 min  Total Treatment Time: 45 min      Plan: Continue skilled Physical Therapy until his 8th visit. Patient to be d/c on 8th visit w/ updated HEP. Patient/Family/Caregiver was advised of these findings, precautions, and treatment options and has agreed to actively participate in planning and for this course of care.     Thank you for your referral. If you have any questions, please contact me at Dept: [phone]. Sincerely,  Electronically signed by therapist: Kolton Flor PTA     Physician's certification required:  No  Please co-sign or sign and return this letter via fax as soon as possible to [phone]. I certify the need for these services furnished under this plan of treatment and while under my care.     X___________________________________________________ Date____________________    Certification From: 40/10/2446  To:1/21/2024

## 2023-10-25 ENCOUNTER — OFFICE VISIT (OUTPATIENT)
Dept: ORTHOPEDICS CLINIC | Facility: CLINIC | Age: 58
End: 2023-10-25

## 2023-10-25 VITALS — BODY MASS INDEX: 30.16 KG/M2 | HEIGHT: 74 IN | WEIGHT: 235 LBS

## 2023-10-25 DIAGNOSIS — M19.011 ARTHRITIS OF RIGHT ACROMIOCLAVICULAR JOINT: ICD-10-CM

## 2023-10-25 DIAGNOSIS — M75.41 IMPINGEMENT SYNDROME OF RIGHT SHOULDER: Primary | ICD-10-CM

## 2023-10-25 DIAGNOSIS — M75.81 TENDINITIS OF RIGHT ROTATOR CUFF: ICD-10-CM

## 2023-10-25 PROCEDURE — 99213 OFFICE O/P EST LOW 20 MIN: CPT | Performed by: ORTHOPAEDIC SURGERY

## 2023-10-25 PROCEDURE — 3008F BODY MASS INDEX DOCD: CPT | Performed by: ORTHOPAEDIC SURGERY

## 2023-10-25 RX ORDER — ACETAMINOPHEN 325 MG/1
325 TABLET ORAL EVERY 6 HOURS PRN
COMMUNITY

## 2023-10-30 ENCOUNTER — OFFICE VISIT (OUTPATIENT)
Dept: NEUROLOGY | Facility: CLINIC | Age: 58
End: 2023-10-30

## 2023-10-30 VITALS
SYSTOLIC BLOOD PRESSURE: 124 MMHG | DIASTOLIC BLOOD PRESSURE: 66 MMHG | WEIGHT: 243.63 LBS | HEART RATE: 70 BPM | BODY MASS INDEX: 31 KG/M2 | RESPIRATION RATE: 16 BRPM

## 2023-10-30 DIAGNOSIS — G43.709 CHRONIC MIGRAINE WITHOUT AURA WITHOUT STATUS MIGRAINOSUS, NOT INTRACTABLE: ICD-10-CM

## 2023-10-30 PROCEDURE — 3074F SYST BP LT 130 MM HG: CPT | Performed by: OTHER

## 2023-10-30 PROCEDURE — 99213 OFFICE O/P EST LOW 20 MIN: CPT | Performed by: OTHER

## 2023-10-30 PROCEDURE — 3078F DIAST BP <80 MM HG: CPT | Performed by: OTHER

## 2023-10-30 RX ORDER — PROPRANOLOL HYDROCHLORIDE 120 MG/1
120 CAPSULE, EXTENDED RELEASE ORAL DAILY
Qty: 90 CAPSULE | Refills: 3 | Status: SHIPPED | OUTPATIENT
Start: 2023-10-30

## 2023-10-31 NOTE — PROGRESS NOTES
HPI:    Patient ID: York Cogan is a 59year old male. HPI     Leroy Delgado is a 66-year-old male who presented for follow up for migraines. He is doing well and states migraines are stable on Propranolol  mg daily. No new complaints       He has a longstanding history of migraines and was diagnosed with migraines 10-15 years ago. States overall his migraines are stable. He gets them very infrequently now and there is no episodes of vertigo or basilar migraines anymore. Migraines now presents as pain in the posterior head mostly on left side associated with light sensitivity. He is on Inderal  mg daily and tolerating fine. No new complaints    Previous visit  Last visit was in 2021, he has been doing well on inderal, he was seen another time virtually in April 2020 due to pandemic for refill of his inderal. he came back for more refill of medication, he tolerated inderal  well.  he has no any neurological symptoms since he was put on inderal. He has been doing well since last seen in 1/2019, he has rare HA last year. He is on inderal  mg qd, he also was diagnosed with sleep apnea, taking CPAP, he feels it helped him a lot. He wake up, not feeling tired or HA anymore. His weight is stable, he has no recent infection.  He is taking feverfew and inderal,         HISTORY:  Past Medical History:   Diagnosis Date    Benign neoplasm of colon     Headache(784.0) 5/22/2013    Lipid screening 1/4//2012    Migraines 3/19/2015    Obstructive apnea 12/01/2017    Edward PSG AHI 19 SaO2 padmini 77 %      Past Surgical History:   Procedure Laterality Date    COLONOSCOPY  [id number]    2004 +polyp-rptd 2005-no polyps then, Siddiqui Shark    COLONOSCOPY  1/27/14    Siddiqui Shark  3 polyps       COLONOSCOPY N/A 12/15/2020    Procedure: COLONOSCOPY, POSSIBLE BIOPSY, POSSIBLE POLYPECTOMY 93372;  Surgeon: Josselyn Wong MD;  Location: [address] [place] [address]      Family History   Problem Relation Age of Onset    Other (colon ca) Other         3 of 9 siblings of father      Social History     Socioeconomic History    Marital status:    Tobacco Use    Smoking status: Never    Smokeless tobacco: Never   Vaping Use    Vaping Use: Never used   Substance and Sexual Activity    Alcohol use: No     Alcohol/week: 0.0 standard drinks of alcohol    Drug use: No   Other Topics Concern    Caffeine Concern Yes     Comment: 1 can of pop daily. Special Diet No    Exercise No        Review of Systems   Constitutional: Negative. HENT: Negative. Eyes: Negative. Respiratory: Negative. Cardiovascular: Negative. Gastrointestinal: Negative. Endocrine: Negative. Genitourinary: Negative. Musculoskeletal: Negative. Skin: Negative. Allergic/Immunologic: Negative. Neurological:  Positive for headaches. Hematological: Negative. Psychiatric/Behavioral: Negative. All other systems reviewed and are negative. Current Outpatient Medications   Medication Sig Dispense Refill    Propranolol HCl  MG Oral Capsule SR 24 Hr Take 1 capsule (120 mg total) by mouth daily. 90 capsule 3    acetaminophen 325 MG Oral Tab Take 1 tablet (325 mg total) by mouth every 6 (six) hours as needed for Pain. atorvastatin 20 MG Oral Tab Take 1 tablet (20 mg total) by mouth every evening. 90 tablet 1    fluticasone propionate 50 MCG/ACT Nasal Suspension 1 spray by Nasal route daily. Allergies:  Dust                      Seasonal                  PHYSICAL EXAM:   Physical Exam  Blood pressure 124/66, pulse 70, resp. rate 16, weight 243 lb 9.6 oz (110.5 kg). General Appearance: Well nourished, well developed, no apparent distress. HEENT: Normocephalic and atraumatic. Normal sclera. Moist mucus membrane  Neck: Normal range of motion. Neck supple. Cardiovascular: Normal rate, regular rhythm and normal heart sounds. Pulmonary/Chest: Effort normal and breath sounds normal.   Abdominal: Soft.  Bowel sounds are normal.     Neurological: Patient is awake, alert and oriented to person, place and time   Normal attention,memory, speech and language  Cranial Nerves: II: Visual acuity: normal  II: Visual fields: normal  III: Pupils: equal, round, reactive to light  III,IV,VI: Extra Ocular Movements: intact  V: Facial sensation: intact  VII: Facial strength: intact  VIII: Hearing: intact  IX: Palate: intact  XI: Shoulder shrug: intact  XII: Tongue movement: normal    Motor: Normal tone. Strength is  5 out of 5 in all extremities bilaterally. DTR: present  Sensory: Sensory examination is normal to light touch and pinprick   Coordination: Finger-to-nose test normal bilaterally without evidence of dysmetria. Gait: normal casual gait         ASSESSMENT/PLAN:     (G43.709) Chronic migraine without aura without status migrainosus, not intractable  (primary encounter diagnosis)  Plan: Propranolol HCl  MG Oral Capsule SR 24 Hr          Stable    Well controlled on Inderal extended release. We will continue the current management    Follow up in about 1 year for refills      See orders and medications filed with this encounter. The patient indicates understanding of these issues and agrees with the plan. Nehemiah Segundo MD  [facility]      Meds This Visit:  Requested Prescriptions     Signed Prescriptions Disp Refills    Propranolol HCl  MG Oral Capsule SR 24 Hr 90 capsule 3     Sig: Take 1 capsule (120 mg total) by mouth daily.        Imaging & Referrals:  None     YN#[id number]

## 2023-11-07 ENCOUNTER — OFFICE VISIT (OUTPATIENT)
Dept: PHYSICAL THERAPY | Age: 58
End: 2023-11-07
Attending: PHYSICIAN ASSISTANT
Payer: OTHER MISCELLANEOUS

## 2023-11-07 PROCEDURE — 97110 THERAPEUTIC EXERCISES: CPT

## 2023-11-07 NOTE — PROGRESS NOTES
Diagnosis:   Impingement syndrome of right shoulder (M75.41)           Referring Provider: Gilberto Olvera  Date of Evaluation:    9/29/2023     Precautions:    Light duty at work for next 5 wks; No lifting,pushing, pulling, carrying >10# or reaching overhead for next 5 weeks   week as of 10/6 Next MD visit:   none scheduled  Date of Surgery: n/a   Insurance Primary/Secondary: [employer] COMP / N/A     # Auth Visits: 8            Subjective: Patient reports that he has been feeling better lately, hasn't been fully compliant w/ HEP. Grades himself \"C- or D+\"    Pain:   0/10      Objective:     Observation/Posture: Protracted shoulders. Sensation: WNL    ROM: (* denotes performed with pain)  Shoulder  Elbow   Flexion: R 170*; L 180  Abduction: R 161*; L 165  ER: R 100; L 93  IR: R 85; L 85 Flexion: R WNL; L WNL  Extension: R WNL; L WNL         Strength/MMT: (* denotes performed with pain)  Shoulder Elbow Scapular   Flexion: R 5/5; L 5/5  Abduction: R 5/5*; L 5/5  ER: R 5/5; L 5/5  IR: R 5/5; L 5/5 Flexion: R 5/5; L 5/5  Extension: R 4+/5; L 5/5    Rhomboids: R 4-/5, L 4/5  Mid trap: R 4/5; L 4/5   Low trap: R 4-/5; L 4/5        Assessment:   Patient was able to tolerate today's tx well without additional pain. Patient was progressed with higher intensity and higher rep volume on various shoulder stability and strengthening activities.         Goals:   (to be met in 8 visits)   Pt will improve shoulder flexion AROM to >170 degrees to be able to reach into overhead cabinets without pain or restriction   Pt will improve shoulder abduction AROM to >160 degrees to improve ability to don deodorant, don/doff shirts, and wash hair   Pt will increase shoulder AROM IR to 85 to be able to reach in back pocket, tuck in shirt, and turn steering wheel without pain  Pt will improve shoulder strength throughout to 5/5 to improve function with lifting/carrying/pushing/pulling at work   Pt will demonstrate increased mid/low trap strength to 4/5 to promote improved shoulder mechanics and stabilization with lifting and reaching   Pt will be independent and compliant with comprehensive HEP to maintain progress achieved in PT   Pt will report 1/10 paint at worst with activity  Pt will be able to return to work at full duty w/o restrictions  Plan:   Date:   10/13/23              TX#: 3/8 Date: 10/17/23                TX#: 4/8 Date:  10/19/23               TX#: 5/8 Date: Date 10/23/23  Tx#: 6/8  Re-assess for work comp - send to ins pool Date: 11/7/23  Tx# 7/8       Manual ther: 10  Joint mobs: AP/distal R shoulder            UBE S11 - L1 - 2'/2'  R prone - I's, T's, rows - 1# x20  R SL ER 1# x20  RTB B ext x20  BTB B rows x20  R unilat ER/IR - single RTB x20  YTB B ER x20  YTB B horz abd x20 UBE S11 - L1 - 2'/2'  R prone - I's, T's rows  2lb x 20  Standing shoulder ext RTB 2 x 20  B/L ER RTB x 20  B/L HABD RTB x 20  YTB ER iso chest press x 15  YTB steering wheels x 10/side  Blue handle band ER iso walkouts x 20  Unilateral blue handle band ER in half kneel x 20       TherEx: 35min  UBE S11 - -L1 - 2min/2min  Standing shoulder flex AAROM w/ hugh x 20  Standing shoulder ext w/ hugh x 20  Standing shoulder flex 1lb x 20  Standing shoulder abd 1lb x 20  Standing B shoulder ER RTB 2 x 15  Standing ER iso shoulder OHP 2 x 10  Standing unilateral ER iso chest press x 15      TherEx: 35min  UBE S11 - -L1 - 2min/2min  Reassessment   Standing shoulder flex 2lb x 20  Standing shoulder abd 2lb x 20  Standing B/L shoulder ER RTB/GTB 2 x 20  RTB ER iso shoulder raise x 20     TherEx: 35min  UBE S11 - -L1 - 2min/2min  Standing row 10lb/side 2 x 15  Standing unilateral ER 3lb 2 x 20  Towel 1lb spell the alphabet at wall  Standing Y's YTB 2 x 10  Flutters YTB 2 x 10  Shoulder flex/abd 1lb x 10  Standing HABD GTB x 15  Wall clock RTB x 5 laps                 HEP:  BID: R prone - I's, T's, rows - 0# x20. Access Code: [email or web address]   URL: "[email or web address]  Date: 10/04/2023  Prepared by: Luis Fernando Eisenberg    Exercises  - Standing Row with Anchored Resistance  - 2 x daily - 7 x weekly - 2 sets - 15 reps - 2 hold  - Shoulder External Rotation and Scapular Retraction with Resistance  - 2 x daily - 7 x weekly - 2 sets - 15 reps - 2 hold  - Shoulder External Rotation with Anchored Resistance  - 2 x daily - 7 x weekly - 2 sets - 15 reps - 3 hold  - Shoulder extension with resistance - Neutral  - 2 x daily - 7 x weekly - 2 sets - 15 reps - 3 hold  Charges: 3 TherEx  Total Timed Treatment: 45 min  Total Treatment Time: 45 min

## 2023-11-09 ENCOUNTER — TELEPHONE (OUTPATIENT)
Dept: PHYSICAL THERAPY | Facility: HOSPITAL | Age: 58
End: 2023-11-09

## 2023-11-09 ENCOUNTER — OFFICE VISIT (OUTPATIENT)
Dept: PHYSICAL THERAPY | Age: 58
End: 2023-11-09
Attending: PHYSICIAN ASSISTANT
Payer: OTHER MISCELLANEOUS

## 2023-11-09 ENCOUNTER — TELEPHONE (OUTPATIENT)
Dept: PHYSICAL THERAPY | Age: 58
End: 2023-11-09

## 2023-11-09 PROCEDURE — 97110 THERAPEUTIC EXERCISES: CPT

## 2023-11-09 NOTE — PROGRESS NOTES
Diagnosis:   Impingement syndrome of right shoulder (M75.41)           Referring Provider: Jillian Soler  Date of Evaluation:    9/29/2023     Precautions:    Light duty at work for next 5 wks; No lifting,pushing, pulling, carrying >20# or reaching overhead for next 5 weeks   week as of 10/6 Next MD visit:   none scheduled  Date of Surgery: n/a   Insurance Primary/Secondary: WORK[employer] COMP / N/A     # Auth Visits: 14            Subjective: Patient reports that he is feeling a lot better today compared to last visit. No longer has additional soreness like he had previously. Pain:   0/10      Objective:     Observation/Posture: Protracted shoulders. Sensation: WNL    ROM: (* denotes performed with pain)  Shoulder  Elbow   Flexion: R 170*; L 180  Abduction: R 161*; L 165  ER: R 100; L 93  IR: R 85; L 85 Flexion: R WNL; L WNL  Extension: R WNL; L WNL         Strength/MMT: (* denotes performed with pain)  Shoulder Elbow Scapular   Flexion: R 5/5; L 5/5  Abduction: R 5/5*; L 5/5  ER: R 5/5; L 5/5  IR: R 5/5; L 5/5 Flexion: R 5/5; L 5/5  Extension: R 4+/5; L 5/5    Rhomboids: R 4-/5, L 4/5  Mid trap: R 4/5; L 4/5   Low trap: R 4-/5; L 4/5        Assessment:   Patient was able to tolerate today's tx well without additional pain or discomfort. Was progressed in resistance and rep volume to further increase shoulder strength and endurance.        Goals:   (to be met in 8 visits)   Pt will improve shoulder flexion AROM to >170 degrees to be able to reach into overhead cabinets without pain or restriction   Pt will improve shoulder abduction AROM to >160 degrees to improve ability to don deodorant, don/doff shirts, and wash hair   Pt will increase shoulder AROM IR to 85 to be able to reach in back pocket, tuck in shirt, and turn steering wheel without pain  Pt will improve shoulder strength throughout to 5/5 to improve function with lifting/carrying/pushing/pulling at work   Pt will demonstrate increased mid/low trap strength to 4/5 to promote improved shoulder mechanics and stabilization with lifting and reaching   Pt will be independent and compliant with comprehensive HEP to maintain progress achieved in PT   Pt will report 1/10 paint at worst with activity  Pt will be able to return to work at full duty w/o restrictions    Plan:  continue RTC strengthening as tolerated.    Date: 10/17/23                TX#: 4/8 Date:  10/19/23               TX#: 5/8 Date: Date 10/23/23  Tx#: 6/8  Re-assess for work comp - send to [employer] pool Date: 11/7/23  Tx# 7/8   Date: 11/9/23  Tx# 8/14    Manual ther: 10  Joint mobs: AP/distal R shoulder             UBE S11 - L1 - 2'/2'  R prone - I's, T's rows  2lb x 20  Standing shoulder ext RTB 2 x 20  B/L ER RTB x 20  B/L HABD RTB x 20  YTB ER iso chest press x 15  YTB steering wheels x 10/side  Blue handle band ER iso walkouts x 20  Unilateral blue handle band ER in half kneel x 20       TherEx: 35min  UBE S11 - -L1 - 2min/2min  Standing shoulder flex AAROM w/ hugh x 20  Standing shoulder ext w/ hugh x 20  Standing shoulder flex 1lb x 20  Standing shoulder abd 1lb x 20  Standing B shoulder ER RTB 2 x 15  Standing ER iso shoulder OHP 2 x 10  Standing unilateral ER iso chest press x 15      TherEx: 35min  UBE S11 - -L1 - 2min/2min  Reassessment   Standing shoulder flex 2lb x 20  Standing shoulder abd 2lb x 20  Standing B/L shoulder ER RTB/GTB 2 x 20  RTB ER iso shoulder raise x 20     TherEx: 35min  UBE S11 - -L1 - 2min/2min  Standing row 10lb/side 2 x 15  Standing unilateral ER 3lb 2 x 20  Towel 1lb spell the alphabet at wall  Standing Y's YTB 2 x 10  Flutters YTB 2 x 10  Shoulder flex/abd 1lb x 10  Standing HABD GTB x 15  Wall clock RTB x 5 laps TherEx: 35min  UBE S11 - -L2 - 2min/2min  Wall towel slide shoulder flex/abd x 10  Cable row 13lb/side 2 x 15  ER isometric walkout red heavy handle band x 10  Blue handle band ER isometric circles CW/CCW x 10  Shoulder flex/abd 2 x 12  B/L shoulder ER 5ct hold, 5ct eccentric GTB 2 x 10  RTB flutters to shoulder height x 10  Body blade lateral pulses x 1min                     HEP:  BID: R prone - I's, T's, rows - 0# x20. Access Code: [email or web address]   URL: [email or web address]  Date: 10/04/2023  Prepared by: Peg Keys    Exercises  - Standing Row with Anchored Resistance  - 2 x daily - 7 x weekly - 2 sets - 15 reps - 2 hold  - Shoulder External Rotation and Scapular Retraction with Resistance  - 2 x daily - 7 x weekly - 2 sets - 15 reps - 2 hold  - Shoulder External Rotation with Anchored Resistance  - 2 x daily - 7 x weekly - 2 sets - 15 reps - 3 hold  - Shoulder extension with resistance - Neutral  - 2 x daily - 7 x weekly - 2 sets - 15 reps - 3 hold  Charges: 3 TherEx  Total Timed Treatment: 38 min (Patient arrived late) Total Treatment Time: 38 min (Patient arrived late)

## 2023-11-10 ENCOUNTER — TELEPHONE (OUTPATIENT)
Dept: PHYSICAL THERAPY | Facility: HOSPITAL | Age: 58
End: 2023-11-10

## 2023-11-13 ENCOUNTER — APPOINTMENT (OUTPATIENT)
Dept: PHYSICAL THERAPY | Age: 58
End: 2023-11-13
Attending: PHYSICIAN ASSISTANT
Payer: OTHER MISCELLANEOUS

## 2023-11-16 ENCOUNTER — OFFICE VISIT (OUTPATIENT)
Dept: PHYSICAL THERAPY | Age: 58
End: 2023-11-16
Attending: PHYSICIAN ASSISTANT
Payer: OTHER MISCELLANEOUS

## 2023-11-16 PROCEDURE — 97110 THERAPEUTIC EXERCISES: CPT

## 2023-11-16 NOTE — PROGRESS NOTES
Diagnosis:   Impingement syndrome of right shoulder (M75.41)           Referring Provider: Saloni Browning  Date of Evaluation:    9/29/2023     Precautions:    Light duty at work for next 5 wks; No lifting,pushing, pulling, carrying >20# or reaching overhead for next 5 weeks   week as of 10/6 Next MD visit:   11/30/23  Date of Surgery: n/a   Insurance Primary/Secondary: WORKERS COMP / N/A     # Auth Visits: 14 v auth           Subjective: Patient arrives to PT today and states he feels like he continues to make slow progress. He states he feels about 35-40% better. He states he has been working a lot. States he still has restrictions at work. Pain:   3/10      Objective:     Observation/Posture: Protracted shoulders. Sensation: WNL    ROM: (* denotes performed with pain)  Shoulder    Flexion: R 170*; L 180  Abduction: R 161*; L 165  ER: R 100; L 93  IR: R 85; L 85      Strength/MMT: (* denotes performed with pain)  Shoulder Elbow Scapular   Flexion: R 5/5; L 5/5  Abduction: R 5/5*; L 5/5  ER: R 5/5; L 5/5  IR: R 5/5; L 5/5 Flexion: R 5/5; L 5/5  Extension: R 4+/5; L 5/5    Rhomboids: R 4-/5, L 4/5  Mid trap: R 4/5; L 4/5   Low trap: R 4-/5; L 4/5        Assessment:   Patient tolerated PT tx well today. Progressed resistance w/ unilateral ER/IR for improved RTC strength and control. Progressed pt to unilateral chest press w/ red swiss ball for improved strength and control w/ pushing - mm fasciculations present indicating impaired strength and neuromuscular control. Added and progressed other exercises for improved shoulder strength and control as tolerated.         Goals:   (to be met in 14 visits)   Pt will improve shoulder flexion AROM to >170 degrees to be able to reach into overhead cabinets without pain or restriction   Pt will improve shoulder abduction AROM to >160 degrees to improve ability to don deodorant, don/doff shirts, and wash hair   Pt will increase shoulder AROM IR to 85 to be able to reach in back pocket, tuck in shirt, and turn steering wheel without pain  Pt will improve shoulder strength throughout to 5/5 to improve function with lifting/carrying/pushing/pulling at work   Pt will demonstrate increased mid/low trap strength to 4/5 to promote improved shoulder mechanics and stabilization with lifting and reaching   Pt will be independent and compliant with comprehensive HEP to maintain progress achieved in PT   Pt will report 1/10 paint at worst with activity  Pt will be able to return to work at full duty w/o restrictions    Plan:  Continue RTC strengthening as tolerated. Re-assess prior to next doctor visit on 11/30/23.   Date:  10/19/23               TX#: 5/8 Date: Date 10/23/23  Tx#: 6/8  Re-assess for work comp - send to [employer] Date: 11/7/23  Tx# 7/8   Date: 11/9/23  Tx# 8/14 Date: 11/16/23  Tx# 9/14   Manual ther: 10  Joint mobs: AP/distal R shoulder              TherEx: 35min  UBE S11 - -L1 - 2min/2min  Standing shoulder flex AAROM w/ hugh x 20  Standing shoulder ext w/ hugh x 20  Standing shoulder flex 1lb x 20  Standing shoulder abd 1lb x 20  Standing B shoulder ER RTB 2 x 15  Standing ER iso shoulder OHP 2 x 10  Standing unilateral ER iso chest press x 15      TherEx: 35min  UBE S11 - -L1 - 2min/2min  Reassessment   Standing shoulder flex 2lb x 20  Standing shoulder abd 2lb x 20  Standing B/L shoulder ER RTB/GTB 2 x 20  RTB ER iso shoulder raise x 20     TherEx: 35min  UBE S11 - -L1 - 2min/2min  Standing row 10lb/side 2 x 15  Standing unilateral ER 3lb 2 x 20  Towel 1lb spell the alphabet at wall  Standing Y's YTB 2 x 10  Flutters YTB 2 x 10  Shoulder flex/abd 1lb x 10  Standing HABD GTB x 15  Wall clock RTB x 5 laps TherEx: 35min  UBE S11 - -L2 - 2min/2min  Wall towel slide shoulder flex/abd x 10  Cable row 13lb/side 2 x 15  ER isometric walkout red heavy handle band x 10  Blue handle band ER isometric circles CW/CCW x 10  Shoulder flex/abd 2 x 12  B/L shoulder ER 5ct hold, 5ct eccentric GTB 2 x 10  RTB flutters to shoulder height x 10  Body blade lateral pulses x 1min     TherEx: 41'  UBE S11 - L1 - 2min/2min  Cable row 13lb/side 3x10  Single BTB - unilat ER/IR 3x10  Shuttle - unilat L chest press w/ RSB 2x10 - 6#  YTB loop - isometric w/ I's to 90 deg 2x10  YTB loop - isometric w/  rotations @ 90 deg flexion x10  YTB loop - isometric w/ hammer curl x15  YTB loop - wall clocks x5L                     HEP:  R prone - I's, T's, rows - 0# x20. Access Code: [email or web address]   URL: [email or web address]  Date: 10/04/2023  Prepared by: Elizabeth Yang    Exercises  - Standing Row with Anchored Resistance  - 2 x daily - 7 x weekly - 2 sets - 15 reps - 2 hold  - Shoulder External Rotation and Scapular Retraction with Resistance  - 2 x daily - 7 x weekly - 2 sets - 15 reps - 2 hold  - Shoulder External Rotation with Anchored Resistance  - 2 x daily - 7 x weekly - 2 sets - 15 reps - 3 hold  - Shoulder extension with resistance - Neutral  - 2 x daily - 7 x weekly - 2 sets - 15 reps - 3 hold    Charges: 3 TherEx   Total Timed Treatment: 41 min   Total Treatment Time: 43 min

## 2023-11-21 ENCOUNTER — OFFICE VISIT (OUTPATIENT)
Dept: PHYSICAL THERAPY | Age: 58
End: 2023-11-21
Attending: PHYSICIAN ASSISTANT
Payer: OTHER MISCELLANEOUS

## 2023-11-21 PROCEDURE — 97110 THERAPEUTIC EXERCISES: CPT

## 2023-11-21 NOTE — PROGRESS NOTES
Diagnosis:   Impingement syndrome of right shoulder (M75.41)           Referring Provider: Iliana Jc  Date of Evaluation:    9/29/2023     Precautions:    Light duty at work for next 5 wks; No lifting,pushing, pulling, carrying >20# or reaching overhead for next 5 weeks   week as of 10/6 Next MD visit:   11/30/23  Date of Surgery: n/a   Insurance Primary/Secondary: [employer] COMP / N/A     # Auth Visits: 14 v auth           Subjective: Patient reports that he was quite sore for the past few days following his previous appt. He is just recovering as of today. Pain:   3-4/10      Objective:     Observation/Posture: Protracted shoulders. Sensation: WNL    ROM: (* denotes performed with pain)  Shoulder    Flexion: R 170*; L 180  Abduction: R 161*; L 165  ER: R 100; L 93  IR: R 85; L 85      Strength/MMT: (* denotes performed with pain)  Shoulder Elbow Scapular   Flexion: R 5/5; L 5/5  Abduction: R 5/5*; L 5/5  ER: R 5/5; L 5/5  IR: R 5/5; L 5/5 Flexion: R 5/5; L 5/5  Extension: R 4+/5; L 5/5    Rhomboids: R 4-/5, L 4/5  Mid trap: R 4/5; L 4/5   Low trap: R 4-/5; L 4/5        Assessment:   Patient tolerated PT tx well today demonstrating improvements in RTC strength and endurance. Was progressed w/ higher repetition volume and initiated on bent over Y raises for mid/low trap recruitment.        Goals:   (to be met in 14 visits)   Pt will improve shoulder flexion AROM to >170 degrees to be able to reach into overhead cabinets without pain or restriction   Pt will improve shoulder abduction AROM to >160 degrees to improve ability to don deodorant, don/doff shirts, and wash hair   Pt will increase shoulder AROM IR to 85 to be able to reach in back pocket, tuck in shirt, and turn steering wheel without pain  Pt will improve shoulder strength throughout to 5/5 to improve function with lifting/carrying/pushing/pulling at work   Pt will demonstrate increased mid/low trap strength to 4/5 to promote improved shoulder mechanics and stabilization with lifting and reaching   Pt will be independent and compliant with comprehensive HEP to maintain progress achieved in PT   Pt will report 1/10 paint at worst with activity  Pt will be able to return to work at full duty w/o restrictions    Plan:  Continue RTC strengthening as tolerated. Re-assess prior to next doctor visit on 11/30/23.   Date:  10/19/23               TX#: 5/8 Date: Date 10/23/23  Tx#: 6/8  Re-assess for work comp - send to [employer] Date: 11/7/23  Tx# 7/8   Date: 11/9/23  Tx# 8/14 Date: 11/16/23  Tx# 9/14 Date: 11/21/23  Tx# 10/14   Manual ther: 10  Joint mobs: AP/distal R shoulder                TherEx: 35min  UBE S11 - -L1 - 2min/2min  Standing shoulder flex AAROM w/ hugh x 20  Standing shoulder ext w/ hugh x 20  Standing shoulder flex 1lb x 20  Standing shoulder abd 1lb x 20  Standing B shoulder ER RTB 2 x 15  Standing ER iso shoulder OHP 2 x 10  Standing unilateral ER iso chest press x 15      TherEx: 35min  UBE S11 - -L1 - 2min/2min  Reassessment   Standing shoulder flex 2lb x 20  Standing shoulder abd 2lb x 20  Standing B/L shoulder ER RTB/GTB 2 x 20  RTB ER iso shoulder raise x 20     TherEx: 35min  UBE S11 - -L1 - 2min/2min  Standing row 10lb/side 2 x 15  Standing unilateral ER 3lb 2 x 20  Towel 1lb spell the alphabet at wall  Standing Y's YTB 2 x 10  Flutters YTB 2 x 10  Shoulder flex/abd 1lb x 10  Standing HABD GTB x 15  Wall clock RTB x 5 laps TherEx: 35min  UBE S11 - -L2 - 2min/2min  Wall towel slide shoulder flex/abd x 10  Cable row 13lb/side 2 x 15  ER isometric walkout red heavy handle band x 10  Blue handle band ER isometric circles CW/CCW x 10  Shoulder flex/abd 1lb 2 x 12  B/L shoulder ER 5ct hold, 5ct eccentric GTB 2 x 10  RTB flutters to shoulder height x 10  Body blade lateral pulses x 1min     TherEx: 41'  UBE S11 - L1 - 2min/2min  Cable row 13lb/side 3x10  Single BTB - unilat ER/IR 3x10  Shuttle - unilat L chest press w/ RSB 2x10 - 6#  YTB loop - isometric w/ I's to 90 deg 2x10  YTB loop - isometric w/  rotations @ 90 deg flexion x10  YTB loop - isometric w/ hammer curl x15  YTB loop - wall clocks x5L     TherEx: 45'  UBE S11 - L1 - 2min/2min  Cable row 13lb/side 3x10  RTB flutters 2 x 10  Shuttle - unilat L chest press w/ RSB 2x10 - 6#  Bent over Y raises 1lb 2 x 10  Shoulder flex to abduction 1lb 2 x 10  Unilateral ER blue tubing 3 x 12  YTB loop  rotations 2 x 10                     HEP:  R prone - I's, T's, rows - 0# x20. Access Code: [email or web address]   URL: [email or web address]  Date: 10/04/2023  Prepared by: Reinaldo Schaeffer    Exercises  - Standing Row with Anchored Resistance  - 2 x daily - 7 x weekly - 2 sets - 15 reps - 2 hold  - Shoulder External Rotation and Scapular Retraction with Resistance  - 2 x daily - 7 x weekly - 2 sets - 15 reps - 2 hold  - Shoulder External Rotation with Anchored Resistance  - 2 x daily - 7 x weekly - 2 sets - 15 reps - 3 hold  - Shoulder extension with resistance - Neutral  - 2 x daily - 7 x weekly - 2 sets - 15 reps - 3 hold    Charges: 3 TherEx   Total Timed Treatment: 45 min   Total Treatment Time: 45 min

## 2023-11-27 ENCOUNTER — OFFICE VISIT (OUTPATIENT)
Dept: PHYSICAL THERAPY | Age: 58
End: 2023-11-27
Attending: PHYSICIAN ASSISTANT
Payer: OTHER MISCELLANEOUS

## 2023-11-27 PROCEDURE — 97110 THERAPEUTIC EXERCISES: CPT

## 2023-11-27 NOTE — PROGRESS NOTES
Diagnosis:   Impingement syndrome of right shoulder (M75.41)           Referring Provider: Andrea Galdamez  Date of Evaluation:    9/29/2023     Precautions:    Light duty at work for next 5 wks; No lifting,pushing, pulling, carrying >20# or reaching overhead for next 5 weeks   week as of 10/6 Next MD visit:   11/30/23  Date of Surgery: n/a   Insurance Primary/Secondary: [employer] COMP / N/A     # Auth Visits: 14 v auth           Subjective: Patient reports that his shoulder isn't as bad today as it was previously. Pain:   1.5/10      Objective:     Observation/Posture: Protracted shoulders. Sensation: WNL    ROM: (* denotes performed with pain)  Shoulder    Flexion: R 170*; L 180  Abduction: R 161*; L 165  ER: R 100; L 93  IR: R 85; L 85      Strength/MMT: (* denotes performed with pain)  Shoulder Elbow Scapular   Flexion: R 5/5; L 5/5  Abduction: R 5/5*; L 5/5  ER: R 5/5; L 5/5  IR: R 5/5; L 5/5 Flexion: R 5/5; L 5/5  Extension: R 4+/5; L 5/5    Rhomboids: R 4-/5, L 4/5  Mid trap: R 4/5; L 4/5   Low trap: R 4-/5; L 4/5        Assessment:   Patient tolerated PT tx well today demonstrating improvements in RTC strength and endurance. Was progressed w/ various movements to strengthen the RTC and improve stability of the [facility] joint including movements such as upside down KB hold.        Goals:   (to be met in 14 visits)   Pt will improve shoulder flexion AROM to >170 degrees to be able to reach into overhead cabinets without pain or restriction   Pt will improve shoulder abduction AROM to >160 degrees to improve ability to don deodorant, don/doff shirts, and wash hair   Pt will increase shoulder AROM IR to 85 to be able to reach in back pocket, tuck in shirt, and turn steering wheel without pain  Pt will improve shoulder strength throughout to 5/5 to improve function with lifting/carrying/pushing/pulling at work   Pt will demonstrate increased mid/low trap strength to 4/5 to promote improved shoulder mechanics and stabilization with lifting and reaching   Pt will be independent and compliant with comprehensive HEP to maintain progress achieved in PT   Pt will report 1/10 paint at worst with activity  Pt will be able to return to work at full duty w/o restrictions    Plan:   Re-assess prior to next doctor visit on 11/30/23.   Date: 11/7/23  Tx# 7/8   Date: 11/9/23  Tx# 8/14 Date: 11/16/23  Tx# 9/14 Date: 11/21/23  Tx# 10/14 Date: 11/27/23  Tx# 11/14   Date: 11/29/23  Tx# 12/14  Reassess for MD visit                   TherEx: 35min  UBE S11 - -L1 - 2min/2min  Standing row 10lb/side 2 x 15  Standing unilateral ER 3lb 2 x 20  Towel 1lb spell the alphabet at wall  Standing Y's YTB 2 x 10  Flutters YTB 2 x 10  Shoulder flex/abd 1lb x 10  Standing HABD GTB x 15  Wall clock RTB x 5 laps TherEx: 35min  UBE S11 - -L2 - 2min/2min  Wall towel slide shoulder flex/abd x 10  Cable row 13lb/side 2 x 15  ER isometric walkout red heavy handle band x 10  Blue handle band ER isometric circles CW/CCW x 10  Shoulder flex/abd 1lb 2 x 12  B/L shoulder ER 5ct hold, 5ct eccentric GTB 2 x 10  RTB flutters to shoulder height x 10  Body blade lateral pulses x 1min     TherEx: 41'  UBE S11 - L1 - 2min/2min  Cable row 13lb/side 3x10  Single BTB - unilat ER/IR 3x10  Shuttle - unilat L chest press w/ RSB 2x10 - 6#  YTB loop - isometric w/ I's to 90 deg 2x10  YTB loop - isometric w/  rotations @ 90 deg flexion x10  YTB loop - isometric w/ hammer curl x15  YTB loop - wall clocks x5L     TherEx: 45'  UBE S11 - L1 - 2min/2min  Cable row 13lb/side 3x10  RTB flutters 2 x 10  Shuttle - unilat L chest press w/ RSB 2x10 - 6#  Bent over Y raises 1lb 2 x 10  Shoulder flex to abduction 1lb 2 x 10  Unilateral ER blue tubing 3 x 12  YTB loop  rotations 2 x 10   TherEx: 45'  UBE S11 - L1 - 2min/2min  TRX row (light) 2 x 15  Unilateral ER blue tubing 2 x 20  Bent over Y raises 1lb 2 x 12-15  Shoulder flex to abduction 1lb 2 x 12-15  Towel 3lb write the alphabet at wall     RTB flutters 2 x 12-15  YTB loop isometric w/ hammer curls 2 x 15  Shoulder flex w/ DB 5-5-X tempo 3lb x 12  Upside down KB hold 9lb 3 x 20' walks                        HEP:  Access Code: [email or web address]  URL: [email or web address]  Date: 11/27/2023  Prepared by: Sly Elizabeth    Exercises  - Standing Row with Anchored Resistance  - 2 x daily - 7 x weekly - 2 sets - 20 reps - 2 hold  - Shoulder External Rotation and Scapular Retraction with Resistance  - 2 x daily - 7 x weekly - 2 sets - 20 reps - 2 hold  - Shoulder extension with resistance - Neutral  - 2 x daily - 7 x weekly - 2 sets - 20 reps - 3 hold  - Standing Shoulder Flexion to 90 Degrees with Dumbbells  - 2 x daily - 7 x weekly - 2 sets - 15 reps - 1 hold  - Scaption with Dumbbells  - 2 x daily - 7 x weekly - 2 sets - 15 reps - 3 hold  - Shoulder Abduction with Dumbbells - Thumbs Up  - 2 x daily - 7 x weekly - 2 sets - 15 reps - 3 hold  - Standing Tricep Extensions with Resistance  - 2 x daily - 7 x weekly - 2 sets - 15 reps - 3 hold  - Standing Bicep Curls Supinated with Dumbbells  - 2 x daily - 7 x weekly - 2 sets - 15 reps - 3 hold    Charges: 3 TherEx   Total Timed Treatment: 45 min   Total Treatment Time: 45 min

## 2023-11-29 ENCOUNTER — OFFICE VISIT (OUTPATIENT)
Dept: PHYSICAL THERAPY | Age: 58
End: 2023-11-29
Attending: PHYSICIAN ASSISTANT
Payer: OTHER MISCELLANEOUS

## 2023-11-29 PROCEDURE — 97110 THERAPEUTIC EXERCISES: CPT

## 2023-11-29 NOTE — PROGRESS NOTES
Progress Summary  Pt has attended 12 visits in Physical Therapy. Diagnosis:   Impingement syndrome of right shoulder (M75.41)           Referring Provider: Camila Stone  Date of Evaluation:    9/29/2023     Precautions:    Light duty at work for next 5 wks; No lifting,pushing, pulling, carrying >20# or reaching overhead for next 5 weeks   week as of 10/6 Next MD visit:   11/30/23  Date of Surgery: n/a   Insurance Primary/Secondary: WORK[employer] COMP / N/A     # Auth Visits: 14 v auth           Subjective: Patient reports that his shoulder was quite achy yesterday, but feels a bit better today. Pain:   5/10 yesterday  3/10 upon arrival      Objective:     Observation/Posture: Protracted shoulders. Sensation: WNL    ROM: (* denotes performed with pain)  Shoulder    Flexion: R 178*; L 180  Abduction: R 172*; L 165  ER: R 110; L 108  IR: R 95; L 93      Strength/MMT: (* denotes performed with pain)  Shoulder Elbow Scapular   Flexion: R 5/5; L 5/5  Abduction: R 5/5; L 5/5  ER: R 5/5; L 5/5  IR: R 5/5; L 5/5 Flexion: R 5/5; L 5/5  Extension: R 5/5; L 5/5    Rhomboids: R 4+/5, L 5/5  Mid trap: R 4+/5; L 4+/5   Low trap: R 4/5; L 4/5       Assessment:   Pt arrived to initial evaluation w/ complaints of R shoulder pain d/t his part time job over the weekend . Functional limitations included reaching overhead, behind back, behind head; with lifting and carrying, with pulling. Objective measurements found that impaired posture, point tenderness at anterior shoulder, impaired R shoulder ROM, impaired R shoulder strength, (+) impingement tests, decreased function. After 12 visits of skilled PT, patient has seen improvements in RUE strength and ROM as well as an overall reduction in pain during his ADLs. Patient should be appropriate for d/c at the end of his POC and is recommended to wean into his full duty responsibilities as long as he is compliant w/ his updated HEP.            Goals:   (to be met in 14 visits)   Pt will improve shoulder flexion AROM to >170 degrees to be able to reach into overhead cabinets without pain or restriction MET  Pt will improve shoulder abduction AROM to >160 degrees to improve ability to don deodorant, don/doff shirts, and wash hair MET  Pt will increase shoulder AROM IR to 85 to be able to reach in back pocket, tuck in shirt, and turn steering wheel without pain MET  Pt will improve shoulder strength throughout to 5/5 to improve function with lifting/carrying/pushing/pulling at work MET  Pt will demonstrate increased mid/low trap strength to 4/5 to promote improved shoulder mechanics and stabilization with lifting and reaching MET  Pt will be independent and compliant with comprehensive HEP to maintain progress achieved in PT MET  Pt will report 1/10 paint at worst with activity Nearly Met  Pt will be able to return to work at full duty w/o restrictions In progress      Plan: Continue skilled Occupational Therapy 2 x/week or a total of 14 visits over a 90 day period. Treatment will include: Manual Therapy, Neuromuscular Re-education, Self-Care Home Management, Therapeutic Activities, Therapeutic Exercise, Home Exercise Program instruction, and Modalities to include: Electrical stimulation (unattended) and ice, heat. Dry needling. Patient/Family/Caregiver was advised of these findings, precautions, and treatment options and has agreed to actively participate in planning and for this course of care. Thank you for your referral. If you have any questions, please contact me at Dept: [phone]. Sincerely,  Electronically signed by therapist: Roman Roque PTA     Physician's certification required:  No  Please co-sign or sign and return this letter via fax as soon as possible to [phone]. I certify the need for these services furnished under this plan of treatment and while under my care.     X___________________________________________________ Date____________________    Certification From: 89/38/3465  To:2/27/2024   Date: 11/7/23  Tx# 7/8   Date: 11/9/23  Tx# 8/14 Date: 11/16/23  Tx# 9/14 Date: 11/21/23  Tx# 10/14 Date: 11/27/23  Tx# 11/14   Date: 11/29/23  Tx# 12/14  Reassess for MD visit                   TherEx: 35min  UBE S11 - -L1 - 2min/2min  Standing row 10lb/side 2 x 15  Standing unilateral ER 3lb 2 x 20  Towel 1lb spell the alphabet at wall  Standing Y's YTB 2 x 10  Flutters YTB 2 x 10  Shoulder flex/abd 1lb x 10  Standing HABD GTB x 15  Wall clock RTB x 5 laps TherEx: 35min  UBE S11 - -L2 - 2min/2min  Wall towel slide shoulder flex/abd x 10  Cable row 13lb/side 2 x 15  ER isometric walkout red heavy handle band x 10  Blue handle band ER isometric circles CW/CCW x 10  Shoulder flex/abd 1lb 2 x 12  B/L shoulder ER 5ct hold, 5ct eccentric GTB 2 x 10  RTB flutters to shoulder height x 10  Body blade lateral pulses x 1min     TherEx: 41'  UBE S11 - L1 - 2min/2min  Cable row 13lb/side 3x10  Single BTB - unilat ER/IR 3x10  Shuttle - unilat L chest press w/ RSB 2x10 - 6#  YTB loop - isometric w/ I's to 90 deg 2x10  YTB loop - isometric w/  rotations @ 90 deg flexion x10  YTB loop - isometric w/ hammer curl x15  YTB loop - wall clocks x5L     TherEx: 45'  UBE S11 - L1 - 2min/2min  Cable row 13lb/side 3x10  RTB flutters 2 x 10  Shuttle - unilat L chest press w/ RSB 2x10 - 6#  Bent over Y raises 1lb 2 x 10  Shoulder flex to abduction 1lb 2 x 10  Unilateral ER blue tubing 3 x 12  YTB loop  rotations 2 x 10   TherEx: 45'  UBE S11 - L1 - 2min/2min  TRX row (light) 2 x 15  Unilateral ER blue tubing 2 x 20  Bent over Y raises 1lb 2 x 12-15  Shoulder flex to abduction 1lb 2 x 12-15  Towel 3lb write the alphabet at wall     RTB flutters 2 x 12-15  YTB loop isometric w/ hammer curls 2 x 15  Shoulder flex w/ DB 5-5-X tempo 3lb x 12  Upside down KB hold 9lb 3 x 20' walks     TherEx: 45min  UBE S11 L1 2min/2min  Reassessment                     HEP: Access Code: [email or web address]  URL: [email or web address]  Date: 11/27/2023  Prepared by: Sukumar Escamilla    Exercises  - Standing Row with Anchored Resistance  - 2 x daily - 7 x weekly - 2 sets - 20 reps - 2 hold  - Shoulder External Rotation and Scapular Retraction with Resistance  - 2 x daily - 7 x weekly - 2 sets - 20 reps - 2 hold  - Shoulder extension with resistance - Neutral  - 2 x daily - 7 x weekly - 2 sets - 20 reps - 3 hold  - Standing Shoulder Flexion to 90 Degrees with Dumbbells  - 2 x daily - 7 x weekly - 2 sets - 15 reps - 1 hold  - Scaption with Dumbbells  - 2 x daily - 7 x weekly - 2 sets - 15 reps - 3 hold  - Shoulder Abduction with Dumbbells - Thumbs Up  - 2 x daily - 7 x weekly - 2 sets - 15 reps - 3 hold  - Standing Tricep Extensions with Resistance  - 2 x daily - 7 x weekly - 2 sets - 15 reps - 3 hold  - Standing Bicep Curls Supinated with Dumbbells  - 2 x daily - 7 x weekly - 2 sets - 15 reps - 3 hold    Charges: 3 TherEx   Total Timed Treatment: 45 min   Total Treatment Time: 45 min

## 2023-11-30 ENCOUNTER — OFFICE VISIT (OUTPATIENT)
Dept: ORTHOPEDICS CLINIC | Facility: CLINIC | Age: 58
End: 2023-11-30
Payer: OTHER MISCELLANEOUS

## 2023-11-30 VITALS — BODY MASS INDEX: 31.18 KG/M2 | WEIGHT: 243 LBS | HEIGHT: 74 IN

## 2023-11-30 DIAGNOSIS — M19.011 ARTHRITIS OF RIGHT ACROMIOCLAVICULAR JOINT: ICD-10-CM

## 2023-11-30 DIAGNOSIS — M75.81 TENDINITIS OF RIGHT ROTATOR CUFF: ICD-10-CM

## 2023-11-30 DIAGNOSIS — M75.41 IMPINGEMENT SYNDROME OF RIGHT SHOULDER: Primary | ICD-10-CM

## 2023-11-30 PROCEDURE — 3008F BODY MASS INDEX DOCD: CPT | Performed by: ORTHOPAEDIC SURGERY

## 2023-11-30 PROCEDURE — 99213 OFFICE O/P EST LOW 20 MIN: CPT | Performed by: ORTHOPAEDIC SURGERY

## 2023-12-01 ENCOUNTER — MED REC SCAN ONLY (OUTPATIENT)
Dept: ORTHOPEDICS CLINIC | Facility: CLINIC | Age: 58
End: 2023-12-01

## 2023-12-06 ENCOUNTER — OFFICE VISIT (OUTPATIENT)
Dept: PHYSICAL THERAPY | Age: 58
End: 2023-12-06
Attending: PHYSICIAN ASSISTANT
Payer: OTHER MISCELLANEOUS

## 2023-12-06 PROCEDURE — 97110 THERAPEUTIC EXERCISES: CPT

## 2023-12-06 NOTE — PROGRESS NOTES
Diagnosis:   Impingement syndrome of right shoulder (M75.41)           Referring Provider: Marva Byrd  Date of Evaluation:    9/29/2023     Precautions:    Light duty at work for next 5 wks; No lifting,pushing, pulling, carrying >20# or reaching overhead for next 5 weeks   week as of 10/6 Next MD visit:   11/30/23  Date of Surgery: n/a   Insurance Primary/Secondary: WORKERS COMP / N/A     # Auth Visits: 14 v auth           Subjective: Patient reports feeling fine coming in today. No additional pain or anything abnormal to report. Pain:   1/10 upon arrival      Objective:     Observation/Posture: Protracted shoulders. Sensation: WNL    ROM: (* denotes performed with pain)  Shoulder    Flexion: R 178*; L 180  Abduction: R 172*; L 165  ER: R 110; L 108  IR: R 95; L 93      Strength/MMT: (* denotes performed with pain)  Shoulder Elbow Scapular   Flexion: R 5/5; L 5/5  Abduction: R 5/5; L 5/5  ER: R 5/5; L 5/5  IR: R 5/5; L 5/5 Flexion: R 5/5; L 5/5  Extension: R 5/5; L 5/5    Rhomboids: R 4+/5, L 5/5  Mid trap: R 4+/5; L 4+/5   Low trap: R 4/5; L 4/5       Assessment:   Patient was able to tolerate today's tx well demonstrating improvements in parascpular and RTC strength and endurance. Was progressed w/ higher resistances and repetition volume on various UE activities.            Goals:   (to be met in 14 visits)   Pt will improve shoulder flexion AROM to >170 degrees to be able to reach into overhead cabinets without pain or restriction MET  Pt will improve shoulder abduction AROM to >160 degrees to improve ability to don deodorant, don/doff shirts, and wash hair MET  Pt will increase shoulder AROM IR to 85 to be able to reach in back pocket, tuck in shirt, and turn steering wheel without pain MET  Pt will improve shoulder strength throughout to 5/5 to improve function with lifting/carrying/pushing/pulling at work MET  Pt will demonstrate increased mid/low trap strength to 4/5 to promote improved shoulder mechanics and stabilization with lifting and reaching MET  Pt will be independent and compliant with comprehensive HEP to maintain progress achieved in PT MET  Pt will report 1/10 paint at worst with activity Nearly Met  Pt will be able to return to work at full duty w/o restrictions In progress      Plan: PN for possible D/C  Date: 11/9/23  Tx# 8/14 Date: 11/16/23  Tx# 9/14 Date: 11/21/23  Tx# 10/14 Date: 11/27/23  Tx# 11/14   Date: 11/29/23  Tx# 12/14  Reassess for MD visit Date: 12/6/23  Tx# 13/14                   TherEx: 35min  UBE S11 - -L2 - 2min/2min  Wall towel slide shoulder flex/abd x 10  Cable row 13lb/side 2 x 15  ER isometric walkout red heavy handle band x 10  Blue handle band ER isometric circles CW/CCW x 10  Shoulder flex/abd 1lb 2 x 12  B/L shoulder ER 5ct hold, 5ct eccentric GTB 2 x 10  RTB flutters to shoulder height x 10  Body blade lateral pulses x 1min     TherEx: 41'  UBE S11 - L1 - 2min/2min  Cable row 13lb/side 3x10  Single BTB - unilat ER/IR 3x10  Shuttle - unilat L chest press w/ RSB 2x10 - 6#  YTB loop - isometric w/ I's to 90 deg 2x10  YTB loop - isometric w/  rotations @ 90 deg flexion x10  YTB loop - isometric w/ hammer curl x15  YTB loop - wall clocks x5L     TherEx: 45'  UBE S11 - L1 - 2min/2min  Cable row 13lb/side 3x10  RTB flutters 2 x 10  Shuttle - unilat L chest press w/ RSB 2x10 - 6#  Bent over Y raises 1lb 2 x 10  Shoulder flex to abduction 1lb 2 x 10  Unilateral ER blue tubing 3 x 12  YTB loop  rotations 2 x 10   TherEx: 45'  UBE S11 - L1 - 2min/2min  TRX row (light) 2 x 15  Unilateral ER blue tubing 2 x 20  Bent over Y raises 1lb 2 x 12-15  Shoulder flex to abduction 1lb 2 x 12-15  Towel 3lb write the alphabet at wall     RTB flutters 2 x 12-15  YTB loop isometric w/ hammer curls 2 x 15  Shoulder flex w/ DB 5-5-X tempo 3lb x 12  Upside down KB hold 9lb 3 x 20' walks     TherEx: 45min  UBE S11 L1 2min/2min  Reassessment   TherEx: 45min  UBE S11 L1 2min/2min  10lb Standing cable rows 2 x 15  Standing shoulder extensions green tubing 2 x 15  Unilateral ER pink tubing 2 x 15  Standing Y scapular retractions YTB 2 x 12-15  RTB flutters 2 x 15-18  Upside down KB hold 9lb 3 x 40' walks  Behind back sled pulls 20lb 3 x down and back                   HEP:  Access Code: [email or web address]  URL: [employer]/  Date: 11/27/2023  Prepared by: Julian Vail    Exercises  - Standing Row with Anchored Resistance  - 2 x daily - 7 x weekly - 2 sets - 20 reps - 2 hold  - Shoulder External Rotation and Scapular Retraction with Resistance  - 2 x daily - 7 x weekly - 2 sets - 20 reps - 2 hold  - Shoulder extension with resistance - Neutral  - 2 x daily - 7 x weekly - 2 sets - 20 reps - 3 hold  - Standing Shoulder Flexion to 90 Degrees with Dumbbells  - 2 x daily - 7 x weekly - 2 sets - 15 reps - 1 hold  - Scaption with Dumbbells  - 2 x daily - 7 x weekly - 2 sets - 15 reps - 3 hold  - Shoulder Abduction with Dumbbells - Thumbs Up  - 2 x daily - 7 x weekly - 2 sets - 15 reps - 3 hold  - Standing Tricep Extensions with Resistance  - 2 x daily - 7 x weekly - 2 sets - 15 reps - 3 hold  - Standing Bicep Curls Supinated with Dumbbells  - 2 x daily - 7 x weekly - 2 sets - 15 reps - 3 hold    Charges: 3 TherEx   Total Timed Treatment: 45 min   Total Treatment Time: 45 min

## 2023-12-08 ENCOUNTER — TELEPHONE (OUTPATIENT)
Dept: ORTHOPEDICS CLINIC | Facility: CLINIC | Age: 58
End: 2023-12-08

## 2023-12-08 NOTE — TELEPHONE ENCOUNTER
Received fax of accommodation medical questionnaire that needs to be completed. I have sent questionnaire to forms dept via email and interoffice.

## 2023-12-13 NOTE — TELEPHONE ENCOUNTER
ADA forms received and logged for processing. No auth on file [employer] message sent to pt.

## 2023-12-14 ENCOUNTER — OFFICE VISIT (OUTPATIENT)
Dept: PHYSICAL THERAPY | Age: 58
End: 2023-12-14
Attending: PHYSICIAN ASSISTANT
Payer: OTHER MISCELLANEOUS

## 2023-12-14 PROCEDURE — 97110 THERAPEUTIC EXERCISES: CPT

## 2023-12-14 NOTE — PROGRESS NOTES
Discharge Summary  Pt has attended 14 visits in Physical Therapy. Diagnosis:   Impingement syndrome of right shoulder (M75.41)           Referring Provider: Hernandez Henderson  Date of Evaluation:    9/29/2023     Precautions:    Per MD note on 11/30 - Push pull lift carry as tolerated floor to chest but limit him from overhead lifting and repetitive overhead activities greater than 20 pounds  Next MD visit:   11/30/23  Date of Surgery: n/a   Insurance Primary/Secondary: WORKERS COMP / N/A     # Auth Visits: 14 v auth           Subjective: Patient reports that he has been overall doing ok, pain was up on Saturday after performing tape removal as a task at work. Functional improvements include improved ROM, as well as light lifting around the house. Functional deficits include tolerance to reaching and lifting. Pain:   2/10 upon arrival  3-4/10 from last Saturday    Objective:     Observation/Posture: Protracted shoulders. Sensation: WNL    ROM: (* denotes performed with pain)  Shoulder    Flexion: R 178*; L 180  Abduction: R 172*; L 165  ER: R 110; L 108  IR: R 95; L 93      Strength/MMT: (* denotes performed with pain)  Shoulder Elbow Scapular   Flexion: R 5/5; L 5/5  Abduction: R 5/5; L 5/5  ER: R 5/5; L 5/5  IR: R 5/5; L 5/5 Flexion: R 5/5; L 5/5  Extension: R 5/5; L 5/5    Rhomboids: R 4+/5, L 5/5  Mid trap: R 4+/5; L 4+/5   Low trap: R 4/5; L 4/5       Assessment:   Pt arrived to initial evaluation w/ complaints of R shoulder pain d/t his part time job over the weekend . Functional limitations included reaching overhead, behind back, behind head; with lifting and carrying, with pulling. Objective measurements found that impaired posture, point tenderness at anterior shoulder, impaired R shoulder ROM, impaired R shoulder strength, (+) impingement tests, decreased function.  After 14 visits of skilled PT, patient has seen improvements in RUE strength and ROM as well as an overall reduction in pain during his ADLs. Patient is to be d/c w/ an updated HEP and should be able to return to work under modified conditions provided by Dr. Lukasz Contreras including lifting from floor to chest as per tolerance and overhead activities no greater than 20lb. Additionally, patient is recommended to begin work conditioning to complete his return to full work duties for 2-3 weeks as per Dr. Kaycee Ho request.           Goals:   (to be met in 15 visits)   Pt will improve shoulder flexion AROM to >170 degrees to be able to reach into overhead cabinets without pain or restriction MET  Pt will improve shoulder abduction AROM to >160 degrees to improve ability to don deodorant, don/doff shirts, and wash hair MET  Pt will increase shoulder AROM IR to 85 to be able to reach in back pocket, tuck in shirt, and turn steering wheel without pain MET  Pt will improve shoulder strength throughout to 5/5 to improve function with lifting/carrying/pushing/pulling at work MET  Pt will demonstrate increased mid/low trap strength to 4/5 to promote improved shoulder mechanics and stabilization with lifting and reaching MET  Pt will be independent and compliant with comprehensive HEP to maintain progress achieved in PT MET  Pt will report 1/10 paint at worst with activity Nearly Met  Pt will be able to return to work at full duty w/o restrictions In progress        Plan: D/C patient to begin work conditioning as per physicians request.     Patient/Family/Caregiver was advised of these findings, precautions, and treatment options and has agreed to actively participate in planning and for this course of care. Thank you for your referral. If you have any questions, please contact me at Dept: [phone]. Sincerely,  Electronically signed by therapist: Sukumar Escamilla PTA     Physician's certification required:  No  Please co-sign or sign and return this letter via fax as soon as possible to [phone].    I certify the need for these services furnished under this plan of treatment and while under my care.     X___________________________________________________ Date____________________    Certification From: 82/11/1351  To:3/13/2024   Date: 11/16/23  Tx# 9/14 Date: 11/21/23  Tx# 10/14 Date: 11/27/23  Tx# 11/14   Date: 11/29/23  Tx# 12/14  Reassess for MD visit Date: 12/6/23  Tx# 13/14 Date: 12/14/23  Tx# 14/14   TherEx: 39'  UBE S11 - L1 - 2min/2min  Cable row 13lb/side 3x10  Single BTB - unilat ER/IR 3x10  Shuttle - unilat L chest press w/ RSB 2x10 - 6#  YTB loop - isometric w/ I's to 90 deg 2x10  YTB loop - isometric w/  rotations @ 90 deg flexion x10  YTB loop - isometric w/ hammer curl x15  YTB loop - wall clocks x5L     TherEx: 45'  UBE S11 - L1 - 2min/2min  Cable row 13lb/side 3x10  RTB flutters 2 x 10  Shuttle - unilat L chest press w/ RSB 2x10 - 6#  Bent over Y raises 1lb 2 x 10  Shoulder flex to abduction 1lb 2 x 10  Unilateral ER blue tubing 3 x 12  YTB loop  rotations 2 x 10   TherEx: 45'  UBE S11 - L1 - 2min/2min  TRX row (light) 2 x 15  Unilateral ER blue tubing 2 x 20  Bent over Y raises 1lb 2 x 12-15  Shoulder flex to abduction 1lb 2 x 12-15  Towel 3lb write the alphabet at wall     RTB flutters 2 x 12-15  YTB loop isometric w/ hammer curls 2 x 15  Shoulder flex w/ DB 5-5-X tempo 3lb x 12  Upside down KB hold 9lb 3 x 20' walks     TherEx: 45min  UBE S11 L1 2min/2min  Reassessment   TherEx: 45min  UBE S11 L1 2min/2min  10lb Standing cable rows 2 x 15  Standing shoulder extensions green tubing 2 x 15  Unilateral ER pink tubing 2 x 15  Standing Y scapular retractions YTB 2 x 12-15  RTB flutters 2 x 15-18  Upside down KB hold 9lb 3 x 40' walks  Behind back sled pulls 20lb 3 x down and back TherEx: 45min  UBE S11 L1 2min/2min  Patient education on work conditioning and planning ahead  Reassessment  HEP review  Standing rows black TB 2 x 15  Standing shoulder extensions GTB 2 x 20  B/L ER RTB 2 x 15                       Access Code: [email or web address]  URL: [email or web address]  Date: 12/14/2023  Prepared by: Sukumar Escamilla    Exercises  - Standing Row with Anchored Resistance  - 2 x daily - 7 x weekly - 2 sets - 20 reps - 2 hold  - Shoulder extension with resistance - Neutral  - 2 x daily - 7 x weekly - 2 sets - 20 reps - 3 hold  - Shoulder External Rotation and Scapular Retraction with Resistance  - 2 x daily - 7 x weekly - 2 sets - 20 reps - 2 hold  - Standing Shoulder Flexion to 90 Degrees with Dumbbells  - 2 x daily - 7 x weekly - 2 sets - 15 reps - 1 hold  - Scaption with Dumbbells  - 2 x daily - 7 x weekly - 2 sets - 15 reps - 1 hold  - Shoulder Abduction with Dumbbells - Thumbs Up  - 2 x daily - 7 x weekly - 2 sets - 15 reps - 1 hold  - Standing Tricep Extensions with Resistance  - 2 x daily - 7 x weekly - 2 sets - 15 reps - 3 hold  - Standing Bicep Curls Supinated with Dumbbells  - 2 x daily - 7 x weekly - 2 sets - 15 reps - 3 hold    Charges: 3 TherEx   Total Timed Treatment: 45 min   Total Treatment Time: 45 min

## 2023-12-19 RX ORDER — ATORVASTATIN CALCIUM 20 MG/1
20 TABLET, FILM COATED ORAL EVERY EVENING
Qty: 30 TABLET | Refills: 0 | Status: SHIPPED | OUTPATIENT
Start: 2023-12-19

## 2023-12-19 NOTE — TELEPHONE ENCOUNTER
[facility] sent to pt to schedule CPX    Atorvastatin 20 mg    Cholesterol Medication Protocol [id number]/19/2023 01:30 AM   Protocol Details ALT < 80    ALT resulted within past year    Lipid panel within past 12 months      Filled 6-26-23  Qty 90  1 refill  No upcoming appt.   LOV 12-5-22 TO

## 2023-12-26 ENCOUNTER — PATIENT MESSAGE (OUTPATIENT)
Dept: INTERNAL MEDICINE CLINIC | Facility: CLINIC | Age: 58
End: 2023-12-26

## 2023-12-28 ENCOUNTER — OFFICE VISIT (OUTPATIENT)
Dept: ORTHOPEDICS CLINIC | Facility: CLINIC | Age: 58
End: 2023-12-28
Payer: OTHER MISCELLANEOUS

## 2023-12-28 ENCOUNTER — LAB ENCOUNTER (OUTPATIENT)
Dept: LAB | Age: 58
End: 2023-12-28
Attending: FAMILY MEDICINE
Payer: COMMERCIAL

## 2023-12-28 ENCOUNTER — TELEPHONE (OUTPATIENT)
Dept: ORTHOPEDICS CLINIC | Facility: CLINIC | Age: 58
End: 2023-12-28

## 2023-12-28 VITALS — BODY MASS INDEX: 31.18 KG/M2 | WEIGHT: 243 LBS | HEIGHT: 74 IN

## 2023-12-28 DIAGNOSIS — M75.81 TENDINITIS OF RIGHT ROTATOR CUFF: ICD-10-CM

## 2023-12-28 DIAGNOSIS — M19.011 ARTHRITIS OF RIGHT ACROMIOCLAVICULAR JOINT: ICD-10-CM

## 2023-12-28 DIAGNOSIS — M75.41 IMPINGEMENT SYNDROME OF RIGHT SHOULDER: Primary | ICD-10-CM

## 2023-12-28 DIAGNOSIS — Z00.00 LABORATORY EXAM ORDERED AS PART OF ROUTINE GENERAL MEDICAL EXAMINATION: ICD-10-CM

## 2023-12-28 LAB
ALBUMIN SERPL-MCNC: 3.6 G/DL (ref 3.4–5)
ALBUMIN/GLOB SERPL: 1 {RATIO} (ref 1–2)
ALP LIVER SERPL-CCNC: 49 U/L
ALT SERPL-CCNC: 41 U/L
ANION GAP SERPL CALC-SCNC: 2 MMOL/L (ref 0–18)
AST SERPL-CCNC: 18 U/L (ref 15–37)
BASOPHILS # BLD AUTO: 0.03 X10(3) UL (ref 0–0.2)
BASOPHILS NFR BLD AUTO: 0.4 %
BILIRUB SERPL-MCNC: 1.4 MG/DL (ref 0.1–2)
BILIRUB UR QL STRIP.AUTO: NEGATIVE
BUN BLD-MCNC: 14 MG/DL (ref 9–23)
CALCIUM BLD-MCNC: 8.8 MG/DL (ref 8.5–10.1)
CHLORIDE SERPL-SCNC: 112 MMOL/L (ref 98–112)
CHOLEST SERPL-MCNC: 141 MG/DL (ref ?–200)
CLARITY UR REFRACT.AUTO: CLEAR
CO2 SERPL-SCNC: 30 MMOL/L (ref 21–32)
COLOR UR AUTO: YELLOW
COMPLEXED PSA SERPL-MCNC: 0.53 NG/ML (ref ?–4)
CREAT BLD-MCNC: 0.93 MG/DL
EGFRCR SERPLBLD CKD-EPI 2021: 94 ML/MIN/1.73M2 (ref 60–?)
EOSINOPHIL # BLD AUTO: 0.13 X10(3) UL (ref 0–0.7)
EOSINOPHIL NFR BLD AUTO: 1.9 %
ERYTHROCYTE [DISTWIDTH] IN BLOOD BY AUTOMATED COUNT: 12.2 %
EST. AVERAGE GLUCOSE BLD GHB EST-MCNC: 126 MG/DL (ref 68–126)
FASTING PATIENT LIPID ANSWER: YES
FASTING STATUS PATIENT QL REPORTED: YES
GLOBULIN PLAS-MCNC: 3.6 G/DL (ref 2.8–4.4)
GLUCOSE BLD-MCNC: 108 MG/DL (ref 70–99)
GLUCOSE UR STRIP.AUTO-MCNC: NORMAL MG/DL
HBA1C MFR BLD: 6 % (ref ?–5.7)
HCT VFR BLD AUTO: 44.2 %
HDLC SERPL-MCNC: 46 MG/DL (ref 40–59)
HGB BLD-MCNC: 14.2 G/DL
IMM GRANULOCYTES # BLD AUTO: 0.02 X10(3) UL (ref 0–1)
IMM GRANULOCYTES NFR BLD: 0.3 %
KETONES UR STRIP.AUTO-MCNC: NEGATIVE MG/DL
LDLC SERPL CALC-MCNC: 85 MG/DL (ref ?–100)
LEUKOCYTE ESTERASE UR QL STRIP.AUTO: NEGATIVE
LYMPHOCYTES # BLD AUTO: 1.9 X10(3) UL (ref 1–4)
LYMPHOCYTES NFR BLD AUTO: 27.5 %
MCH RBC QN AUTO: 30 PG (ref 26–34)
MCHC RBC AUTO-ENTMCNC: 32.1 G/DL (ref 31–37)
MCV RBC AUTO: 93.2 FL
MONOCYTES # BLD AUTO: 0.47 X10(3) UL (ref 0.1–1)
MONOCYTES NFR BLD AUTO: 6.8 %
NEUTROPHILS # BLD AUTO: 4.36 X10 (3) UL (ref 1.5–7.7)
NEUTROPHILS # BLD AUTO: 4.36 X10(3) UL (ref 1.5–7.7)
NEUTROPHILS NFR BLD AUTO: 63.1 %
NITRITE UR QL STRIP.AUTO: NEGATIVE
NONHDLC SERPL-MCNC: 95 MG/DL (ref ?–130)
OSMOLALITY SERPL CALC.SUM OF ELEC: 299 MOSM/KG (ref 275–295)
PH UR STRIP.AUTO: 6 [PH] (ref 5–8)
PLATELET # BLD AUTO: 190 10(3)UL (ref 150–450)
POTASSIUM SERPL-SCNC: 4.2 MMOL/L (ref 3.5–5.1)
PROT SERPL-MCNC: 7.2 G/DL (ref 6.4–8.2)
RBC # BLD AUTO: 4.74 X10(6)UL
RBC UR QL AUTO: NEGATIVE
SODIUM SERPL-SCNC: 144 MMOL/L (ref 136–145)
SP GR UR STRIP.AUTO: 1.03 (ref 1–1.03)
TRIGL SERPL-MCNC: 44 MG/DL (ref 30–149)
UROBILINOGEN UR STRIP.AUTO-MCNC: NORMAL MG/DL
VLDLC SERPL CALC-MCNC: 7 MG/DL (ref 0–30)
WBC # BLD AUTO: 6.9 X10(3) UL (ref 4–11)

## 2023-12-28 PROCEDURE — 81003 URINALYSIS AUTO W/O SCOPE: CPT | Performed by: FAMILY MEDICINE

## 2023-12-28 PROCEDURE — 80061 LIPID PANEL: CPT | Performed by: FAMILY MEDICINE

## 2023-12-28 PROCEDURE — G0103 PSA SCREENING: HCPCS | Performed by: FAMILY MEDICINE

## 2023-12-28 PROCEDURE — 83036 HEMOGLOBIN GLYCOSYLATED A1C: CPT | Performed by: FAMILY MEDICINE

## 2023-12-28 PROCEDURE — 3008F BODY MASS INDEX DOCD: CPT | Performed by: ORTHOPAEDIC SURGERY

## 2023-12-28 PROCEDURE — 80053 COMPREHEN METABOLIC PANEL: CPT | Performed by: FAMILY MEDICINE

## 2023-12-28 PROCEDURE — 85025 COMPLETE CBC W/AUTO DIFF WBC: CPT | Performed by: FAMILY MEDICINE

## 2023-12-28 PROCEDURE — 99213 OFFICE O/P EST LOW 20 MIN: CPT | Performed by: ORTHOPAEDIC SURGERY

## 2023-12-28 RX ORDER — IBUPROFEN 800 MG/1
TABLET ORAL
COMMUNITY
Start: 2023-12-27

## 2023-12-28 RX ORDER — AMOXICILLIN 500 MG/1
TABLET, FILM COATED ORAL
COMMUNITY
Start: 2023-12-27

## 2023-12-28 NOTE — TELEPHONE ENCOUNTER
Received subpoena from [place] [address] requesting medical records. They are requesting any and all medical records and media, from any and all dates of service, including but not limited to, referrals, treatment and progress records, x-ray reports, nurses' notes, chart and progress notes, questionnaires or history forms, pain diagrams completed by patient completed by the patient, electronic records, appointment schedules, telephone records, any correspondence or memoranda, and billing and payment records regarding patient. Sent to [facility] stat.

## 2024-01-03 ENCOUNTER — OFFICE VISIT (OUTPATIENT)
Dept: INTERNAL MEDICINE CLINIC | Facility: CLINIC | Age: 59
End: 2024-01-03
Payer: COMMERCIAL

## 2024-01-03 VITALS
SYSTOLIC BLOOD PRESSURE: 132 MMHG | HEART RATE: 70 BPM | OXYGEN SATURATION: 98 % | DIASTOLIC BLOOD PRESSURE: 84 MMHG | WEIGHT: 237.63 LBS | HEIGHT: 74 IN | BODY MASS INDEX: 30.5 KG/M2 | TEMPERATURE: 99 F

## 2024-01-03 DIAGNOSIS — Z00.00 ROUTINE PHYSICAL EXAMINATION: Primary | ICD-10-CM

## 2024-01-03 PROCEDURE — 3079F DIAST BP 80-89 MM HG: CPT | Performed by: FAMILY MEDICINE

## 2024-01-03 PROCEDURE — 3008F BODY MASS INDEX DOCD: CPT | Performed by: FAMILY MEDICINE

## 2024-01-03 PROCEDURE — 3075F SYST BP GE 130 - 139MM HG: CPT | Performed by: FAMILY MEDICINE

## 2024-01-03 PROCEDURE — 99396 PREV VISIT EST AGE 40-64: CPT | Performed by: FAMILY MEDICINE

## 2024-01-03 RX ORDER — ATORVASTATIN CALCIUM 20 MG/1
20 TABLET, FILM COATED ORAL EVERY EVENING
Qty: 90 TABLET | Refills: 3 | Status: SHIPPED | OUTPATIENT
Start: 2024-01-03

## 2024-01-03 NOTE — PROGRESS NOTES
Michael Abreu is a 58 year old male who presents for a complete physical exam.   HPI:       Wt Readings from Last 6 Encounters:   01/03/24 237 lb 9.6 oz (107.8 kg)   12/28/23 243 lb (110.2 kg)   11/30/23 243 lb (110.2 kg)   10/30/23 243 lb 9.6 oz (110.5 kg)   10/25/23 235 lb (106.6 kg)   09/13/23 235 lb (106.6 kg)     Body mass index is 30.51 kg/m².     Cholesterol, Total (mg/dL)   Date Value   12/28/2023 141   12/03/2022 130   11/29/2021 152     CHOLESTEROL, TOTAL (mg/dL)   Date Value   11/25/2017 148   10/08/2016 182   10/23/2015 153     HDL Cholesterol (mg/dL)   Date Value   12/28/2023 46   12/03/2022 52   11/29/2021 44     HDL CHOLESTEROL (mg/dL)   Date Value   11/25/2017 42   10/08/2016 47   10/23/2015 44     LDL Cholesterol (mg/dL)   Date Value   12/28/2023 85   12/03/2022 69   11/29/2021 97     LDL-CHOLESTEROL (mg/dL (calc))   Date Value   11/25/2017 91   10/08/2016 126   10/23/2015 100     AST (U/L)   Date Value   12/28/2023 18   12/03/2022 16   11/29/2021 17   11/25/2017 19   10/08/2016 23   10/23/2015 16     ALT (U/L)   Date Value   12/28/2023 41   12/03/2022 34   11/29/2021 35   11/25/2017 29   10/08/2016 42   10/23/2015 21      Current Outpatient Medications   Medication Sig Dispense Refill    amoxicillin 500 MG Oral Tab       ibuprofen 800 MG Oral Tab       atorvastatin 20 MG Oral Tab Take 1 tablet (20 mg total) by mouth every evening. DUE FOR AN APPOINTMENT ASAP 30 tablet 0    Propranolol HCl  MG Oral Capsule SR 24 Hr Take 1 capsule (120 mg total) by mouth daily. 90 capsule 3    acetaminophen 325 MG Oral Tab Take 1 tablet (325 mg total) by mouth every 6 (six) hours as needed for Pain.      fluticasone propionate 50 MCG/ACT Nasal Suspension 1 spray by Nasal route daily.        Past Medical History:   Diagnosis Date    Benign neoplasm of colon     Headache(784.0) 5/22/2013    Lipid screening 1/4//2012    Migraines 3/19/2015    Obstructive apnea 12/01/2017    Edward PSG AHI 19 SaO2 padmini 77 %       Past Surgical History:   Procedure Laterality Date    COLONOSCOPY  2004,2005    2004 +polyp-rptd 2005-no polyps then, Rakan    COLONOSCOPY  1/27/14    Rakan  3 polyps       COLONOSCOPY N/A 12/15/2020    Procedure: COLONOSCOPY, POSSIBLE BIOPSY, POSSIBLE POLYPECTOMY 56801;  Surgeon: Jc Bledsoe MD;  Location: [facility], [facility]      Family History   Problem Relation Age of Onset    Other (colon ca) Other         3 of 9 siblings of father      Social History:  Social History     Socioeconomic History    Marital status:    Tobacco Use    Smoking status: Never    Smokeless tobacco: Never   Vaping Use    Vaping Use: Never used   Substance and Sexual Activity    Alcohol use: No     Alcohol/week: 0.0 standard drinks of alcohol    Drug use: No   Other Topics Concern    Caffeine Concern Yes     Comment: 1 can of pop daily.     Special Diet No    Exercise No      .        REVIEW OF SYSTEMS:   GENERAL: feels well otherwise  SKIN: denies rash  HEENT: denies nasal congestion, sinus pain or ST  LUNGS: denies shortness of breath  CARDIOVASCULAR: denies chest pain on exertion  GI: denies abdominal pain  : denies dysuria  NEURO: HA's good   on med   rare CHIN    Dr Napier    MUSC:  finished PT shoulder  by Dr Craft  PSYCHE: denies depression or anxiety  HEMATOLOGIC: denies hx of anemia  ENDOCRINE: denies thyroid history  ALL/ASTHMA: denies hx of allergy or asthma    EXAM:   /84 (BP Location: Right arm, Patient Position: Sitting, Cuff Size: large)   Pulse 70   Temp 98.7 °F (37.1 °C) (Temporal)   Ht 6' 2\" (1.88 m)   Wt 237 lb 9.6 oz (107.8 kg)   SpO2 98%   BMI 30.51 kg/m²   Body mass index is 30.51 kg/m².   GENERAL: well developed, well nourished,in no apparent distress  SKIN: no rashes,  HEENT: atraumatic, normocephalic,ears and throat are clear  NECK: supple,no adenopathy  LUNGS: clear to auscultation  CARDIO: RRR without murmur  GI: good BS's,no masses, HSM or tenderness  : two descended testes,no  masses,no hernia,no penile lesions  RECTAL: good rectal tone, prostate shows no masses, stool is OB negative  EXTREMITIES: no edema  NEURO: motor and sensory are grossly intact    ASSESSMENT AND PLAN:   Michael Abreu is a 58 year old male who presents for a complete physical exam.  Health maintenance, discussed recent labs    FBS going up     needs to loose wt     225 by next px     not interested in vaccines  today  The patient indicates understanding of these issues and agrees to the plan.  The patient is asked to return for CPX in 1yr.

## 2024-11-14 DIAGNOSIS — G43.709 CHRONIC MIGRAINE WITHOUT AURA WITHOUT STATUS MIGRAINOSUS, NOT INTRACTABLE: ICD-10-CM

## 2024-11-14 RX ORDER — PROPRANOLOL HYDROCHLORIDE 120 MG/1
120 CAPSULE, EXTENDED RELEASE ORAL DAILY
Qty: 90 CAPSULE | Refills: 0 | Status: SHIPPED | OUTPATIENT
Start: 2024-11-14

## 2024-11-14 NOTE — TELEPHONE ENCOUNTER
Sent the patient a [employer] message to make an appointment for further medication refills.         Medication: PROPRANOLOL HCL  MG Oral Capsule SR 24 Hr      Date of last refill: 10/30/2023 (#90/3)  [place]     Last office visit: 10/30/2023  Due back to clinic per last office note:  1 year  Date next office visit scheduled:    No future appointments.        Last OV note recommendation:    ASSESSMENT/PLAN:      (G43.709) Chronic migraine without aura without status migrainosus, not intractable  (primary encounter diagnosis)  Plan: Propranolol HCl  MG Oral Capsule SR 24 Hr          Stable     Well controlled on Inderal extended release.    We will continue the current management     Follow up in about 1 year for refills        See orders and medications filed with this encounter. The patient indicates understanding of these issues and agrees with the plan.           Simon Napier MD  [facility]

## 2024-11-21 ENCOUNTER — OFFICE VISIT (OUTPATIENT)
Dept: NEUROLOGY | Facility: CLINIC | Age: 59
End: 2024-11-21
Payer: COMMERCIAL

## 2024-11-21 VITALS
DIASTOLIC BLOOD PRESSURE: 82 MMHG | WEIGHT: 235 LBS | BODY MASS INDEX: 30 KG/M2 | SYSTOLIC BLOOD PRESSURE: 130 MMHG | OXYGEN SATURATION: 96 % | HEART RATE: 79 BPM | RESPIRATION RATE: 16 BRPM

## 2024-11-21 DIAGNOSIS — G43.709 CHRONIC MIGRAINE WITHOUT AURA WITHOUT STATUS MIGRAINOSUS, NOT INTRACTABLE: Primary | ICD-10-CM

## 2024-11-21 PROCEDURE — 99213 OFFICE O/P EST LOW 20 MIN: CPT | Performed by: OTHER

## 2024-11-21 PROCEDURE — 3075F SYST BP GE 130 - 139MM HG: CPT | Performed by: OTHER

## 2024-11-21 PROCEDURE — 3079F DIAST BP 80-89 MM HG: CPT | Performed by: OTHER

## 2024-11-21 RX ORDER — PROPRANOLOL HYDROCHLORIDE 120 MG/1
120 CAPSULE, EXTENDED RELEASE ORAL DAILY
Qty: 90 CAPSULE | Refills: 3 | Status: SHIPPED | OUTPATIENT
Start: 2024-11-21

## 2024-11-21 NOTE — PATIENT INSTRUCTIONS
After your visit at the [place] office  today, please direct any follow up questions or medication needs to the staff in our [place] office so that your concerns may be promptly addressed.  We are available through [employer] or at the numbers below:    The phone number is:   [phone], option 1    The fax number is:  [phone]    Your pharmacy should also send any requests electronically to the [place] office.       Refill policies:    Allow 2-3 business days for refills; controlled substances may take longer.  Contact your pharmacy at least 5 days prior to running out of medication and have them send an electronic request or submit request through the “request refill” option in your [employer] account.  Refills are not addressed on weekends; covering physicians do not authorize routine medications on weekends.  No narcotics or controlled substances are refilled after noon on Fridays or by on call physicians.  By law, narcotics must be electronically prescribed.  A 30 day supply with no refills is the maximum allowed.  If your prescription is due for a refill, you may be due for a follow up appointment.  To best provide you care, patients receiving routine medications need to be seen at least once a year.  Patients receiving narcotic/controlled substance medications need to be seen at least once every 3 months.  In the event that your preferred pharmacy does not have the requested medication in stock (e.g. Backordered), it is your responsibility to find another pharmacy that has the requested medication available.  We will gladly send a new prescription to that pharmacy at your request.    Scheduling Tests:    If your physician has ordered radiology tests such as MRI or CT scans, please contact Central Scheduling at [phone] right away to schedule the test.  Once scheduled, the [facility] Centralized Referral Team will work with your insurance carrier to obtain pre-certification or prior authorization.   Depending on your insurance carrier, approval may take 3-10 days.  It is highly recommended patients assure they have received an authorization before having a test performed.  If test is done without insurance authorization, patient may be responsible for the entire amount billed.      Precertification and Prior Authorizations:  If your physician has recommended that you have a procedure or additional testing performed the [facility] Centralized Referral Team will contact your insurance carrier to obtain pre-certification or prior authorization.    You are strongly encouraged to contact your insurance carrier to verify that your procedure/test has been approved and is a COVERED benefit.  Although the [facility] Centralized Referral Team does its due diligence, the insurance carrier gives the disclaimer that \"Although the procedure is authorized, this does not guarantee payment.\"    Ultimately the patient is responsible for payment.   Thank you for your understanding in this matter.  Paperwork Completion:  If you require FMLA or disability paperwork for your recovery, please make sure to either drop it off or have it faxed to our office at [phone]. Be sure the form has your name and date of birth on it.  The form will be faxed to our Forms Department and they will complete it for you.  There is a 25$ fee for all forms that need to be filled out.  Please be aware there is a 10-14 day turnaround time.  You will need to sign a release of information (MILY) form if your paperwork does not come with one.  You may call the Forms Department with any questions at [phone].  Their fax number is [phone].

## 2024-11-21 NOTE — PROGRESS NOTES
HPI:    Patient ID: Michael Abreu is a 59 year old male.    Migraine          Michael Abreu is a 59-year-old male who presented for follow up for migraines.  He is doing well and states migraines are stable on Propranolol  mg daily. He is also on Feverfew supplements.  No new complaints       He has a longstanding history of migraines and was diagnosed with migraines 10-15 years ago.  States overall his migraines are stable.  He gets them very infrequently now and there is no episodes of vertigo or basilar migraines anymore. Migraines now presents as pain in the posterior head mostly on left side associated with light sensitivity. He is on Inderal  mg daily and tolerating fine. No new complaints    Previous visit  Last visit was in 2021, he has been doing well on inderal, he was seen another time virtually in April 2020 due to pandemic for refill of his inderal. he came back for more refill of medication, he tolerated inderal  well.  he has no any neurological symptoms since he was put on inderal. He has been doing well since last seen in 1/2019, he has rare HA last year. He is on inderal  mg qd, he also was diagnosed with sleep apnea, taking CPAP, he feels it helped him a lot. He wake up, not feeling tired or HA anymore.   His weight is stable, he has no recent infection. He is taking feverfew and inderal,         HISTORY:  Past Medical History:    Benign neoplasm of colon    Headache(784.0)    Lipid screening    Migraines    Obstructive apnea    Edward PSG AHI 19 SaO2 padmini 77 %      Past Surgical History:   Procedure Laterality Date    Colonoscopy  2004,2005 2004 +polyp-rptd 2005-no polyps then, Rakan    Colonoscopy  1/27/14    Rakan  3 polyps       Colonoscopy N/A 12/15/2020    Procedure: COLONOSCOPY, POSSIBLE BIOPSY, POSSIBLE POLYPECTOMY 96625;  Surgeon: Jc Bledsoe MD;  Location: [facility], [facility]      Family History   Problem Relation Age of Onset    Other (colon ca) Other          3 of 9 siblings of father      Social History     Socioeconomic History    Marital status:    Tobacco Use    Smoking status: Never    Smokeless tobacco: Never   Vaping Use    Vaping status: Never Used   Substance and Sexual Activity    Alcohol use: No     Alcohol/week: 0.0 standard drinks of alcohol    Drug use: No   Other Topics Concern    Caffeine Concern Yes     Comment: 1 can of pop daily.     Special Diet No    Exercise No        Review of Systems   Constitutional: Negative.    HENT: Negative.     Eyes: Negative.    Respiratory: Negative.     Cardiovascular: Negative.    Gastrointestinal: Negative.    Endocrine: Negative.    Genitourinary: Negative.    Musculoskeletal: Negative.    Skin: Negative.    Allergic/Immunologic: Negative.    Neurological:  Positive for headaches.   Hematological: Negative.    Psychiatric/Behavioral: Negative.     All other systems reviewed and are negative.           Current Outpatient Medications   Medication Sig Dispense Refill    PROPRANOLOL HCL  MG Oral Capsule SR 24 Hr Take 1 capsule (120 mg total) by mouth daily 90 capsule 0    atorvastatin 20 MG Oral Tab Take 1 tablet (20 mg total) by mouth every evening. 90 tablet 3    ibuprofen 800 MG Oral Tab       acetaminophen 325 MG Oral Tab Take 1 tablet (325 mg total) by mouth every 6 (six) hours as needed for Pain.      fluticasone propionate 50 MCG/ACT Nasal Suspension 1 spray by Nasal route daily.       Allergies:  Allergies   Allergen Reactions    Dust     Seasonal      PHYSICAL EXAM:   Physical Exam  Blood pressure 130/82, pulse 79, resp. rate 16, weight 235 lb (106.6 kg), SpO2 96%.      General Appearance: Well nourished, well developed, no apparent distress.   HEENT: Normocephalic and atraumatic. Normal sclera. Moist mucus membrane  Neck: Normal range of motion. Neck supple.  Cardiovascular: Normal rate, regular rhythm and normal heart sounds.    Pulmonary/Chest: Effort normal and breath sounds normal.    Abdominal: Soft. Bowel sounds are normal.     Neurological: Patient is awake, alert and oriented to person, place and time   Normal attention,memory, speech and language  Cranial Nerves: II: Visual acuity: normal  II: Visual fields: normal  III: Pupils: equal, round, reactive to light  III,IV,VI: Extra Ocular Movements: intact  V: Facial sensation: intact  VII: Facial strength: intact  VIII: Hearing: intact  IX: Palate: intact  XI: Shoulder shrug: intact  XII: Tongue movement: normal    Motor: Normal tone. Strength is  5 out of 5 in all extremities bilaterally.  DTR: present  Sensory: Sensory examination is normal to light touch and pinprick   Coordination: Finger-to-nose test normal bilaterally without evidence of dysmetria.  Gait: normal casual gait         ASSESSMENT/PLAN:     (G43.709) Chronic migraine without aura without status migrainosus, not intractable  (primary encounter diagnosis)  Plan: Propranolol HCl  MG Oral Capsule SR 24 Hr          Stable overall  Well controlled on Inderal extended release.    We will continue the current management    Follow up in about 1 year for refills      See orders and medications filed with this encounter. The patient indicates understanding of these issues and agrees with the plan.        Simon Napier MD  [facility]        Meds This Visit:  Requested Prescriptions      No prescriptions requested or ordered in this encounter       Imaging & Referrals:  None     ID#[id number]

## 2025-01-15 NOTE — TELEPHONE ENCOUNTER
MCM sent to patient--due for Px     Requesting    Name from pharmacy: Atorvastatin Calcium 20 Mg Tab Nort         Will file in chart as: ATORVASTATIN 20 MG Oral Tab    Sig: TAKE ONE TABLET BY MOUTH EVERY EVENING    Disp: 90 tablet    Refills: 0    Start: 1/15/2025    Class: Normal    Non-formulary    Last ordered: 1 year ago (1/3/2024) by Oksana Solano MD    Last refill: 10/16/2024    Rx #: [id number]    Cholesterol Medication Protocol Rxbyux73/15/2025 04:13 AM   Protocol Details ALT < 80    ALT resulted within past year    Lipid panel within past 12 months    In person appointment or virtual visit in the past 12 mos or appointment in next 3 mos    Medication is active on med list        LOV: 1/3/2024  RTC: 1 year   Last Relevant Labs: 12/28/2023  Filled: 1/3/2024 #90 with 3 refills    No future appointments.

## 2025-01-18 ENCOUNTER — PATIENT MESSAGE (OUTPATIENT)
Dept: INTERNAL MEDICINE CLINIC | Facility: CLINIC | Age: 60
End: 2025-01-18

## 2025-01-18 DIAGNOSIS — Z00.00 LABORATORY EXAM ORDERED AS PART OF ROUTINE GENERAL MEDICAL EXAMINATION: Primary | ICD-10-CM

## 2025-01-18 RX ORDER — ATORVASTATIN CALCIUM 20 MG/1
20 TABLET, FILM COATED ORAL EVERY EVENING
Qty: 90 TABLET | Refills: 0 | Status: SHIPPED | OUTPATIENT
Start: 2025-01-18

## 2025-01-19 RX ORDER — ATORVASTATIN CALCIUM 20 MG/1
20 TABLET, FILM COATED ORAL EVERY EVENING
Qty: 90 TABLET | Refills: 3 | OUTPATIENT
Start: 2025-01-19

## 2025-02-04 ENCOUNTER — LAB ENCOUNTER (OUTPATIENT)
Dept: LAB | Age: 60
End: 2025-02-04
Attending: FAMILY MEDICINE
Payer: COMMERCIAL

## 2025-02-04 LAB
ALBUMIN SERPL-MCNC: 4.2 G/DL (ref 3.2–4.8)
ALBUMIN/GLOB SERPL: 1.4 {RATIO} (ref 1–2)
ALP LIVER SERPL-CCNC: 53 U/L
ALT SERPL-CCNC: 33 U/L
ANION GAP SERPL CALC-SCNC: 12 MMOL/L (ref 0–18)
AST SERPL-CCNC: 25 U/L (ref ?–34)
BASOPHILS # BLD AUTO: 0.02 X10(3) UL (ref 0–0.2)
BASOPHILS NFR BLD AUTO: 0.3 %
BILIRUB SERPL-MCNC: 1.7 MG/DL (ref 0.2–1.1)
BILIRUB UR QL STRIP.AUTO: NEGATIVE
BUN BLD-MCNC: 14 MG/DL (ref 9–23)
CALCIUM BLD-MCNC: 9.3 MG/DL (ref 8.7–10.6)
CHLORIDE SERPL-SCNC: 105 MMOL/L (ref 98–112)
CHOLEST SERPL-MCNC: 148 MG/DL (ref ?–200)
CLARITY UR REFRACT.AUTO: CLEAR
CO2 SERPL-SCNC: 24 MMOL/L (ref 21–32)
COLOR UR AUTO: YELLOW
COMPLEXED PSA SERPL-MCNC: 0.5 NG/ML (ref ?–4)
CREAT BLD-MCNC: 1.02 MG/DL
EGFRCR SERPLBLD CKD-EPI 2021: 83 ML/MIN/1.73M2 (ref 60–?)
EOSINOPHIL # BLD AUTO: 0.25 X10(3) UL (ref 0–0.7)
EOSINOPHIL NFR BLD AUTO: 3.7 %
ERYTHROCYTE [DISTWIDTH] IN BLOOD BY AUTOMATED COUNT: 12.1 %
EST. AVERAGE GLUCOSE BLD GHB EST-MCNC: 128 MG/DL (ref 68–126)
FASTING PATIENT LIPID ANSWER: YES
FASTING STATUS PATIENT QL REPORTED: YES
GLOBULIN PLAS-MCNC: 3 G/DL (ref 2–3.5)
GLUCOSE BLD-MCNC: 97 MG/DL (ref 70–99)
GLUCOSE UR STRIP.AUTO-MCNC: NORMAL MG/DL
HBA1C MFR BLD: 6.1 % (ref ?–5.7)
HCT VFR BLD AUTO: 43.8 %
HDLC SERPL-MCNC: 37 MG/DL (ref 40–59)
HGB BLD-MCNC: 14.3 G/DL
IMM GRANULOCYTES # BLD AUTO: 0.01 X10(3) UL (ref 0–1)
IMM GRANULOCYTES NFR BLD: 0.1 %
KETONES UR STRIP.AUTO-MCNC: NEGATIVE MG/DL
LDLC SERPL CALC-MCNC: 99 MG/DL (ref ?–100)
LEUKOCYTE ESTERASE UR QL STRIP.AUTO: NEGATIVE
LYMPHOCYTES # BLD AUTO: 2.62 X10(3) UL (ref 1–4)
LYMPHOCYTES NFR BLD AUTO: 38.5 %
MCH RBC QN AUTO: 29.9 PG (ref 26–34)
MCHC RBC AUTO-ENTMCNC: 32.6 G/DL (ref 31–37)
MCV RBC AUTO: 91.4 FL
MONOCYTES # BLD AUTO: 0.48 X10(3) UL (ref 0.1–1)
MONOCYTES NFR BLD AUTO: 7.1 %
NEUTROPHILS # BLD AUTO: 3.42 X10 (3) UL (ref 1.5–7.7)
NEUTROPHILS # BLD AUTO: 3.42 X10(3) UL (ref 1.5–7.7)
NEUTROPHILS NFR BLD AUTO: 50.3 %
NITRITE UR QL STRIP.AUTO: NEGATIVE
NONHDLC SERPL-MCNC: 111 MG/DL (ref ?–130)
OSMOLALITY SERPL CALC.SUM OF ELEC: 292 MOSM/KG (ref 275–295)
PH UR STRIP.AUTO: 6 [PH] (ref 5–8)
PLATELET # BLD AUTO: 189 10(3)UL (ref 150–450)
POTASSIUM SERPL-SCNC: 3.9 MMOL/L (ref 3.5–5.1)
PROT SERPL-MCNC: 7.2 G/DL (ref 5.7–8.2)
PROT UR STRIP.AUTO-MCNC: 20 MG/DL
RBC # BLD AUTO: 4.79 X10(6)UL
RBC UR QL AUTO: NEGATIVE
SODIUM SERPL-SCNC: 141 MMOL/L (ref 136–145)
SP GR UR STRIP.AUTO: >1.03 (ref 1–1.03)
TRIGL SERPL-MCNC: 56 MG/DL (ref 30–149)
UROBILINOGEN UR STRIP.AUTO-MCNC: NORMAL MG/DL
VLDLC SERPL CALC-MCNC: 9 MG/DL (ref 0–30)
WBC # BLD AUTO: 6.8 X10(3) UL (ref 4–11)

## 2025-02-04 PROCEDURE — 85025 COMPLETE CBC W/AUTO DIFF WBC: CPT | Performed by: FAMILY MEDICINE

## 2025-02-04 PROCEDURE — 81001 URINALYSIS AUTO W/SCOPE: CPT | Performed by: FAMILY MEDICINE

## 2025-02-04 PROCEDURE — 83036 HEMOGLOBIN GLYCOSYLATED A1C: CPT | Performed by: FAMILY MEDICINE

## 2025-02-04 PROCEDURE — 80053 COMPREHEN METABOLIC PANEL: CPT | Performed by: FAMILY MEDICINE

## 2025-02-04 PROCEDURE — 80061 LIPID PANEL: CPT | Performed by: FAMILY MEDICINE

## 2025-02-04 PROCEDURE — 36415 COLL VENOUS BLD VENIPUNCTURE: CPT | Performed by: FAMILY MEDICINE

## 2025-02-10 ENCOUNTER — OFFICE VISIT (OUTPATIENT)
Dept: INTERNAL MEDICINE CLINIC | Facility: CLINIC | Age: 60
End: 2025-02-10
Payer: COMMERCIAL

## 2025-02-10 VITALS
OXYGEN SATURATION: 99 % | BODY MASS INDEX: 30.5 KG/M2 | HEIGHT: 74 IN | HEART RATE: 69 BPM | TEMPERATURE: 98 F | SYSTOLIC BLOOD PRESSURE: 128 MMHG | WEIGHT: 237.63 LBS | DIASTOLIC BLOOD PRESSURE: 82 MMHG

## 2025-02-10 DIAGNOSIS — Z71.85 VACCINE COUNSELING: ICD-10-CM

## 2025-02-10 DIAGNOSIS — E66.9 OBESITY (BMI 30-39.9): ICD-10-CM

## 2025-02-10 DIAGNOSIS — R17 ELEVATED BILIRUBIN: ICD-10-CM

## 2025-02-10 DIAGNOSIS — E78.00 PURE HYPERCHOLESTEROLEMIA: Primary | ICD-10-CM

## 2025-02-10 DIAGNOSIS — R73.09 OTHER ABNORMAL GLUCOSE: ICD-10-CM

## 2025-02-10 PROCEDURE — 99214 OFFICE O/P EST MOD 30 MIN: CPT | Performed by: FAMILY MEDICINE

## 2025-02-10 NOTE — PROGRESS NOTES
Michael Abreu is a 60 year old male.  HPI:   Here for f/u on the meds     overall doing well    Had labs recently    On meds as directed      Job moved to [place] in Nov     Has not lost the weight that he wanted     Breathing is good   no CP   had URI last month      No vaccines lately    not interested at this time          Current Outpatient Medications   Medication Sig Dispense Refill    ATORVASTATIN 20 MG Oral Tab TAKE ONE TABLET BY MOUTH EVERY EVENING 90 tablet 0    Propranolol HCl  MG Oral Capsule SR 24 Hr Take 1 capsule (120 mg total) by mouth daily. 90 capsule 3    Ibuprofen 200 MG Oral Cap 2 capsules (400 mg total).      fluticasone propionate 50 MCG/ACT Nasal Suspension 1 spray by Nasal route daily.        Past Medical History:    Benign neoplasm of colon    Headache(784.0)    Lipid screening    Migraines    Obstructive apnea    [facility] AHI 19 SaO2 padmini 77 %      Social History:  Social History     Socioeconomic History    Marital status:    Tobacco Use    Smoking status: Never    Smokeless tobacco: Never   Vaping Use    Vaping status: Never Used   Substance and Sexual Activity    Alcohol use: No     Alcohol/week: 0.0 standard drinks of alcohol    Drug use: No   Other Topics Concern    Caffeine Concern Yes     Comment: 1 can of pop daily.     Special Diet No    Exercise No        REVIEW OF SYSTEMS:   GENERAL HEALTH: feels well otherwise  SKIN: denies rashes  RESPIRATORY: denies shortness of breath  CARDIOVASCULAR: denies chest pain   GI: denies abdominal pain  NEURO: denies headaches    EXAM:   /82 (BP Location: Right arm, Patient Position: Sitting, Cuff Size: large)   Pulse 69   Temp 98.3 °F (36.8 °C) (Temporal)   Ht 6' 2\" (1.88 m)   Wt 237 lb 9.6 oz (107.8 kg)   SpO2 99%   BMI 30.51 kg/m²   GENERAL: well developed, well nourished,in no apparent distress  SKIN: no rashes  NECK: supple,no adenopathy  LUNGS: clear to auscultation  CARDIO: RRR without murmur  EXTREMITIES: no  edema    ASSESSMENT AND PLAN:   1. Pure hypercholesterolemia  On meds   good control but LDL creeping up   weight loss can help        2. Other abnormal glucose  Discussed labs    Again, weight loss can help        3. Vaccine counseling  Not interested in vaccines today       4. Obesity (BMI 30-39.9)  Discussed weight        5. Elevated bilirubin  Has been stable going back to 2012         The patient indicates understanding of these issues and agrees to the plan.  .

## 2025-03-19 ENCOUNTER — OFFICE VISIT (OUTPATIENT)
Dept: INTERNAL MEDICINE CLINIC | Facility: CLINIC | Age: 60
End: 2025-03-19
Payer: COMMERCIAL

## 2025-03-19 VITALS
WEIGHT: 231.19 LBS | HEART RATE: 67 BPM | HEIGHT: 74 IN | OXYGEN SATURATION: 97 % | SYSTOLIC BLOOD PRESSURE: 118 MMHG | BODY MASS INDEX: 29.67 KG/M2 | DIASTOLIC BLOOD PRESSURE: 80 MMHG | TEMPERATURE: 98 F

## 2025-03-19 DIAGNOSIS — E66.9 OBESITY (BMI 30-39.9): ICD-10-CM

## 2025-03-19 DIAGNOSIS — R10.13 EPIGASTRIC PAIN: ICD-10-CM

## 2025-03-19 DIAGNOSIS — K40.90 RIGHT INGUINAL HERNIA: Primary | ICD-10-CM

## 2025-03-19 PROCEDURE — 99213 OFFICE O/P EST LOW 20 MIN: CPT | Performed by: FAMILY MEDICINE

## 2025-03-19 NOTE — PROGRESS NOTES
Michael Abreu is a 60 year old male.  HPI:   Here for RLQ swelling that he noted within 2 weeks ago after a shower   no pain w it   no injury    occ nausea but BMs are ok  occ constipated      In the last month did have some pain in the umbilical area    comes and goes       Current Outpatient Medications   Medication Sig Dispense Refill    ATORVASTATIN 20 MG Oral Tab TAKE ONE TABLET BY MOUTH EVERY EVENING 90 tablet 0    Propranolol HCl  MG Oral Capsule SR 24 Hr Take 1 capsule (120 mg total) by mouth daily. 90 capsule 3    Ibuprofen 200 MG Oral Cap 2 capsules (400 mg total).      fluticasone propionate 50 MCG/ACT Nasal Suspension 1 spray by Nasal route daily.        Past Medical History:    Benign neoplasm of colon    Headache(784.0)    Lipid screening    Migraines    Obstructive apnea    [facility] AHI 19 SaO2 padmini 77 %      Social History:  Social History     Socioeconomic History    Marital status:    Tobacco Use    Smoking status: Never    Smokeless tobacco: Never   Vaping Use    Vaping status: Never Used   Substance and Sexual Activity    Alcohol use: No     Alcohol/week: 0.0 standard drinks of alcohol    Drug use: No   Other Topics Concern    Caffeine Concern Yes     Comment: 1 can of pop daily.     Special Diet No    Exercise No        REVIEW OF SYSTEMS:   GENERAL HEALTH: feels well otherwise   has lost some weight w better eating habits         EXAM:   /80 (BP Location: Right arm, Patient Position: Sitting, Cuff Size: large)   Pulse 67   Temp 97.8 °F (36.6 °C) (Temporal)   Ht 6' 2\" (1.88 m)   Wt 231 lb 3.2 oz (104.9 kg)   SpO2 97%   BMI 29.68 kg/m²   GENERAL: well developed, well nourished,in no apparent distress   ABD:   soft  no mass or HSM   mild umb disc   +RIH noted     ASSESSMENT AND PLAN:   1. Right inguinal hernia  D/w pt and wife    will refer to Dr Prado  - Refer to General Surgery  - Surgery Referral - In Network    2. Obesity (BMI 30-39.9)  Pleased w weight loss     CPM      3. Epigastric pain  Could be umbilical hernia as well    Dr Prado to see     - Refer to General Surgery  - Surgery Referral - In Network     The patient indicates understanding of these issues and agrees to the plan.  .

## 2025-04-17 RX ORDER — ATORVASTATIN CALCIUM 20 MG/1
20 TABLET, FILM COATED ORAL EVERY EVENING
Qty: 90 TABLET | Refills: 0 | Status: SHIPPED | OUTPATIENT
Start: 2025-04-17

## 2025-04-17 NOTE — TELEPHONE ENCOUNTER
Requesting    Name from pharmacy: Atorvastatin Calcium 20 Mg Tab Nort         Will file in chart as: ATORVASTATIN 20 MG Oral Tab    Sig: TAKE ONE TABLET BY MOUTH EVERY EVENING    Disp: 90 tablet    Refills: 0    Start: 4/17/2025    Class: Normal    Non-formulary    Last ordered: 2 months ago (1/18/2025) by Oksana Solano MD    Last refill: 1/18/2025    Rx #: [id number]    Cholesterol Medication Protocol [id number]/17/2025 12:42 PM   Protocol Details ALT < 80    ALT resulted within past year    Lipid panel within past 12 months    In person appointment or virtual visit in the past 12 mos or appointment in next 3 mos    Medication is active on med list           LOV: 2/10/2025  RTC: none noted   Last Relevant Labs: 2/4/2025  Filled: 1/18/2025 #90 with 0 refills    Future Appointments   Date Time Provider Department Center   5/6/2025 10:00 AM Dharmesh Prado MD [id number]

## 2025-05-06 ENCOUNTER — OFFICE VISIT (OUTPATIENT)
Facility: LOCATION | Age: 60
End: 2025-05-06
Payer: COMMERCIAL

## 2025-05-06 VITALS
OXYGEN SATURATION: 95 % | HEART RATE: 62 BPM | SYSTOLIC BLOOD PRESSURE: 131 MMHG | DIASTOLIC BLOOD PRESSURE: 86 MMHG | TEMPERATURE: 98 F

## 2025-05-06 DIAGNOSIS — K42.9 UMBILICAL HERNIA WITHOUT OBSTRUCTION AND WITHOUT GANGRENE: ICD-10-CM

## 2025-05-06 DIAGNOSIS — K40.30 INCARCERATED RIGHT INGUINAL HERNIA: Primary | ICD-10-CM

## 2025-05-06 PROCEDURE — 99244 OFF/OP CNSLTJ NEW/EST MOD 40: CPT | Performed by: SURGERY

## 2025-05-06 NOTE — H&P
New Patient Visit Note       Active Problems      1. Incarcerated right inguinal hernia    2. Umbilical hernia without obstruction and without gangrene        Chief Complaint   Chief Complaint   Patient presents with    New Patient     NP - Right inguinal hernia, Epigastric pain, Ref. by .  never had EGD done.  Hernia started in about Jan, noticed in March. Naval pain when standing, walking. Then noticed the bulge in the inguinal area. Mostly having the umbilical pain.          History of Present Illness     The patient presents request for his primary care physician for evaluation of right inguinal hernia.  Patient had been experiencing mild periumbilical pain several weeks to months ago.  The patient then experienced a enlarging bulge with associated discomfort in the right inguinal region.  Physical exam was consistent with inguinal hernia and therefore surgical consultation is requested.  The patient does experience discomfort with standing and walking as well as with exertion.    The patient denies fever, chills, chest pain, shortness of breath, dyspnea. The patient also denies hematemesis, melena, or hematochezia. The patient denies change in bowel or bladder habits. There is no complaint of hematuria or dysuria.    I discussed the risk, benefits, alternatives of surgery.  I discussed the anticipated postoperative recovery including dietary, activity, exercise, and lifestyle recommendations.  The patient's questions regarding surgical procedure were answered and the patient voiced understanding of the care plan.    Allergies  Michael is allergic to dust and seasonal.    Past Medical / Surgical / Social / Family History    The past medical and past surgical history have been reviewed by me today.    Past Medical History[1]  Past Surgical History[2]    The family history and social history have been reviewed by me today.    Family History[3]  Social Hx on file[4]   Medications - Current[5]      Review of  Systems  The Review of Systems has been reviewed by me during today.  Review of Systems   Constitutional: Negative.    HENT: Negative.     Eyes: Negative.    Respiratory: Negative.     Cardiovascular: Negative.    Gastrointestinal: Negative.    Genitourinary: Negative.    Musculoskeletal: Negative.    Skin: Negative.    Neurological: Negative.    Psychiatric/Behavioral: Negative.         Physical Findings   /86 (BP Location: Left arm, Patient Position: Sitting, Cuff Size: adult)   Pulse 62   Temp 98.1 °F (36.7 °C) (Temporal)   SpO2 95%   Physical Exam  Vitals and nursing note reviewed.   Constitutional:       General: He is not in acute distress.     Appearance: He is well-developed.   HENT:      Head: Normocephalic and atraumatic.   Eyes:      General: No scleral icterus.     Pupils: Pupils are equal, round, and reactive to light.   Neck:      Vascular: No JVD.      Trachea: No tracheal deviation.   Cardiovascular:      Rate and Rhythm: Normal rate and regular rhythm.   Pulmonary:      Effort: Pulmonary effort is normal. No respiratory distress.      Breath sounds: No stridor.   Abdominal:      General: Bowel sounds are normal. There is no distension.      Palpations: Abdomen is soft. Abdomen is not rigid. There is no mass.      Tenderness: There is no abdominal tenderness. There is no guarding or rebound. Negative signs include Best's sign and McBurney's sign.      Hernia: A hernia is present. Hernia is present in the umbilical area and right inguinal area. There is no hernia in the left inguinal area.       Genitourinary:     Penis: Normal. No phimosis, paraphimosis, hypospadias, erythema, tenderness, discharge, swelling or lesions.       Testes: Normal.         Right: Mass, tenderness, swelling, testicular hydrocele or varicocele not present. Right testis is descended. Cremasteric reflex is present.          Left: Mass, tenderness, swelling, testicular hydrocele or varicocele not present. Left testis  is descended. Cremasteric reflex is present.       Epididymis:      Right: Normal.      Left: Normal.   Musculoskeletal:         General: Normal range of motion.      Cervical back: Normal range of motion and neck supple.   Skin:     General: Skin is warm and dry.   Neurological:      Mental Status: He is alert and oriented to person, place, and time.   Psychiatric:         Behavior: Behavior normal.             Assessment   1. Incarcerated right inguinal hernia    2. Umbilical hernia without obstruction and without gangrene          Plan     The patient will be scheduled for robotic right inguinal hernia repair with mesh and umbilical herniorrhaphy.    The howard-operative care plan was discussed with the patient, who voices understanding.  Activity and lifting recommendations were discussed in length.     The risks, benefits, and alternatives to the procedure were explained to the patient.  The risks explained include, but are not limited to, bleeding, infection, pain wound complications, recurrence, incorrect diagnosis, injury to adjacent organs and structures. We also discussed the possibile need for further therapeutic, diagnostic, or surgical intervention.  The patient voiced understanding, and after all questions were answered to the patient's satisfaction, the patient provided willing and informed consent to proceed.     No orders of the defined types were placed in this encounter.      Imaging & Referrals   None    Follow Up  No follow-ups on file.    Dharmesh Prado MD    Date of Surgery:     DX:     ICD-10-CM   1. Incarcerated right inguinal hernia  K40.30   2. Umbilical hernia without obstruction and without gangrene  K42.9        Surgery Site :RIGHT / LEFT / BILATERAL: right    [] Open  [] Laparoscopic  [x] Robotic    [] Pilonidal Cystectomy  [] Excision of Lipomas     [] Sigmoidectomies    [] Hemorrhoidectomy   [] Transanal Hemorrhoidal Dearterialization [] Rectal Exam Under Anesthesia  [] Ureteral  stent, Urologist  [x] Hernia   [] Ventral  [x] Umbilical  [x] Inguinal  [] Incisional  [] Paraesophageal [] Component Separation (TAR)   [] Cholecystectomy  [] Appendectomy    [] Port placement      Anesthesia: ANESTHESIA TYPE: Gen    Location:  [] [facility]   [] [facility]  [] [facility]  [x] [facility]   [] [facility]    Admission Status: EDW OR ADMIT LOCATION: [facility]    SA Needed: Yes/No: Yes: PA       Clearance Needed:  []Medical Clearance   []Cardiac Clearance:   []Anticoagulation Management:    []Renal:   []Neuro:     Hx sleep apnea:          Pacemaker:        Latex allergies:      Pre-Admission Testing:  Surgeon's Personalized order Set.   Prophylactic Antibiotics Order: Prophylactic antibiotic protocol    Equipment:   [] C-Arm  Other:      Dharmesh Prado MD FACS  Trauma Medical Director, [facility]    The 21st Century Cures Act makes medical notes like these available to patients in the interest of transparency. Please be advised this is a medical document. Medical documents are intended to carry relevant information, facts as evident, and the clinical opinion of the practitioner. The medical note is intended as peer to peer communication and may appear blunt or direct. It is written in medical language and may contain abbreviations or verbiage that are unfamiliar.    This note was prepared using Dragon Medical voice recognition dictation software. As a result, errors may occur. When identified, these errors have been corrected. While every attempt is made to correct errors during dictation, discrepancies may still exist.             [1]   Past Medical History:   Allergic rhinitis    Benign neoplasm of colon    Headache(784.0)    Hyperlipidemia    Lipid screening    Migraines    Obstructive apnea    [facility] AHI 19 SaO2 padmini 77 %    Sleep apnea   [2]   Past Surgical History:  Procedure Laterality Date    Colonoscopy  2004,2005    2004 +polyp-rptd 2005-no polyps then,  Rakan    Colonoscopy  12    [facility]akan  3 polyps       Colonoscopy N/A 12/15/2020    Procedure: COLONOSCOPY, POSSIBLE BIOPSY, POSSIBLE POLYPECTOMY 73086;  Surgeon: Jc Bledsoe MD;  Location: [facility], [facility]   [3]   Family History  Problem Relation Age of Onset    Colon Cancer Father     Cancer Father          - lung cancer    Other (colon ca) Other         3 of 9 siblings of father    Heart Disease Maternal Grandmother     Heart Disorder Maternal Grandmother          - congestive heart failure   [4]   Social History  Socioeconomic History    Marital status:    Tobacco Use    Smoking status: Never    Smokeless tobacco: Never   Vaping Use    Vaping status: Never Used   Substance and Sexual Activity    Alcohol use: No    Drug use: No   Other Topics Concern    Caffeine Concern No    Stress Concern No    Weight Concern No    Special Diet No    Exercise No   [5]   Current Outpatient Medications:     ATORVASTATIN 20 MG Oral Tab, TAKE ONE TABLET BY MOUTH EVERY EVENING, Disp: 90 tablet, Rfl: 0    Propranolol HCl  MG Oral Capsule SR 24 Hr, Take 1 capsule (120 mg total) by mouth daily., Disp: 90 capsule, Rfl: 3    Ibuprofen 200 MG Oral Cap, 2 capsules (400 mg total)., Disp: , Rfl:     fluticasone propionate 50 MCG/ACT Nasal Suspension, 1 spray by Nasal route in the morning., Disp: , Rfl:

## 2025-05-07 ENCOUNTER — TELEPHONE (OUTPATIENT)
Facility: LOCATION | Age: 60
End: 2025-05-07

## 2025-05-08 ENCOUNTER — TELEPHONE (OUTPATIENT)
Facility: LOCATION | Age: 60
End: 2025-05-08

## 2025-05-08 DIAGNOSIS — K40.30 INCARCERATED RIGHT INGUINAL HERNIA: Primary | ICD-10-CM

## 2025-07-16 RX ORDER — ATORVASTATIN CALCIUM 20 MG/1
20 TABLET, FILM COATED ORAL EVERY EVENING
Qty: 90 TABLET | Refills: 0 | Status: SHIPPED | OUTPATIENT
Start: 2025-07-16

## 2025-07-16 NOTE — TELEPHONE ENCOUNTER
Requesting    Name from pharmacy: Atorvastatin Calcium 20 Mg Tab Nort         Will file in chart as: ATORVASTATIN 20 MG Oral Tab    Sig: TAKE 1 TABLET BY MOUTH EVERY EVENING    Disp: 90 tablet    Refills: 0    Start: 7/15/2025    Class: Normal    Non-formulary    Last ordered: 3 months ago (4/17/2025) by Oksana Solano MD    Last refill: 4/17/2025    Rx #: [id number]    Cholesterol Medication Protocol [id number]/15/2025 08:27 PM   Protocol Details ALT < 80    ALT resulted within past year    Lipid panel within past 12 months    In person appointment or virtual visit in the past 12 mos or appointment in next 3 mos    Medication is active on med list           LOV: 3/19/2025  RTC: none noted   Last Relevant Labs: 2/4/2025  Filled: 4/17/2025 #90 with 0 refills    No future appointments.

## 2025-07-28 ENCOUNTER — TELEPHONE (OUTPATIENT)
Facility: LOCATION | Age: 60
End: 2025-07-28

## 2025-07-28 NOTE — TELEPHONE ENCOUNTER
Prior authorization approved for cpt codes 73233 and 74697. Ref #: [id number]. Spoke with Mariela @ 12:47pm on 7/28 through [facility].

## 2025-07-29 ENCOUNTER — APPOINTMENT (OUTPATIENT)
Dept: ADMINISTRATIVE | Facility: HOSPITAL | Age: 60
End: 2025-07-29

## 2025-07-29 RX ORDER — GARLIC EXTRACT 500 MG
1 CAPSULE ORAL DAILY
COMMUNITY

## 2025-07-29 RX ORDER — UBIDECARENONE 30 MG
1 CAPSULE ORAL DAILY
COMMUNITY

## 2025-07-29 RX ORDER — MULTIVIT WITH MINERALS/LUTEIN
1000 TABLET ORAL DAILY
COMMUNITY

## 2025-07-29 RX ORDER — AMOXICILLIN 500 MG
1 CAPSULE ORAL DAILY
COMMUNITY

## 2025-07-31 ENCOUNTER — TELEPHONE (OUTPATIENT)
Facility: LOCATION | Age: 60
End: 2025-07-31

## 2025-08-02 ENCOUNTER — EKG ENCOUNTER (OUTPATIENT)
Dept: LAB | Age: 60
End: 2025-08-02

## 2025-08-02 ENCOUNTER — LABORATORY ENCOUNTER (OUTPATIENT)
Dept: LAB | Age: 60
End: 2025-08-02

## 2025-08-02 DIAGNOSIS — Z01.818 PRE-OP TESTING: ICD-10-CM

## 2025-08-02 LAB
ANION GAP SERPL CALC-SCNC: 7 MMOL/L (ref 0–18)
BUN BLD-MCNC: 14 MG/DL (ref 9–23)
CALCIUM BLD-MCNC: 9.1 MG/DL (ref 8.7–10.6)
CHLORIDE SERPL-SCNC: 109 MMOL/L (ref 98–112)
CO2 SERPL-SCNC: 29 MMOL/L (ref 21–32)
CREAT BLD-MCNC: 1.05 MG/DL (ref 0.7–1.3)
EGFRCR SERPLBLD CKD-EPI 2021: 80 ML/MIN/1.73M2 (ref 60–?)
FASTING STATUS PATIENT QL REPORTED: YES
GLUCOSE BLD-MCNC: 110 MG/DL (ref 70–99)
OSMOLALITY SERPL CALC.SUM OF ELEC: 301 MOSM/KG (ref 275–295)
POTASSIUM SERPL-SCNC: 4.2 MMOL/L (ref 3.5–5.1)
SODIUM SERPL-SCNC: 145 MMOL/L (ref 136–145)

## 2025-08-02 PROCEDURE — 93005 ELECTROCARDIOGRAM TRACING: CPT

## 2025-08-02 PROCEDURE — 80048 BASIC METABOLIC PNL TOTAL CA: CPT

## 2025-08-02 PROCEDURE — 36415 COLL VENOUS BLD VENIPUNCTURE: CPT

## 2025-08-02 PROCEDURE — 93010 ELECTROCARDIOGRAM REPORT: CPT | Performed by: INTERNAL MEDICINE

## 2025-08-03 LAB
ATRIAL RATE: 60 BPM
P AXIS: 65 DEGREES
P-R INTERVAL: 180 MS
Q-T INTERVAL: 400 MS
QRS DURATION: 100 MS
QTC CALCULATION (BEZET): 400 MS
R AXIS: 43 DEGREES
T AXIS: 63 DEGREES
VENTRICULAR RATE: 60 BPM

## 2025-08-11 ENCOUNTER — HOSPITAL ENCOUNTER (OUTPATIENT)
Facility: HOSPITAL | Age: 60
Setting detail: HOSPITAL OUTPATIENT SURGERY
Discharge: HOME | End: 2025-08-11
Attending: SURGERY | Admitting: SURGERY

## 2025-08-11 ENCOUNTER — ANESTHESIA EVENT (OUTPATIENT)
Dept: SURGERY | Facility: HOSPITAL | Age: 60
End: 2025-08-11

## 2025-08-11 ENCOUNTER — ANESTHESIA (OUTPATIENT)
Dept: SURGERY | Facility: HOSPITAL | Age: 60
End: 2025-08-11

## 2025-08-11 VITALS
WEIGHT: 220 LBS | HEART RATE: 73 BPM | SYSTOLIC BLOOD PRESSURE: 111 MMHG | OXYGEN SATURATION: 93 % | RESPIRATION RATE: 16 BRPM | TEMPERATURE: 98 F | BODY MASS INDEX: 28.23 KG/M2 | HEIGHT: 74 IN | DIASTOLIC BLOOD PRESSURE: 79 MMHG

## 2025-08-11 DIAGNOSIS — Z01.818 PRE-OP TESTING: ICD-10-CM

## 2025-08-11 DIAGNOSIS — K40.30 INCARCERATED RIGHT INGUINAL HERNIA: Primary | ICD-10-CM

## 2025-08-11 PROCEDURE — 49592 RPR AA HRN 1ST < 3 NCR/STRN: CPT | Performed by: SURGERY

## 2025-08-11 PROCEDURE — 49650 LAP ING HERNIA REPAIR INIT: CPT

## 2025-08-11 PROCEDURE — 49650 LAP ING HERNIA REPAIR INIT: CPT | Performed by: SURGERY

## 2025-08-11 PROCEDURE — 49592 RPR AA HRN 1ST < 3 NCR/STRN: CPT

## 2025-08-11 PROCEDURE — S2900 ROBOTIC SURGICAL SYSTEM: HCPCS | Performed by: SURGERY

## 2025-08-11 DEVICE — MESH HERN 10X15CM POLY POLYLACTIC ACID 70%: Type: IMPLANTABLE DEVICE | Site: INGUINAL | Status: FUNCTIONAL

## 2025-08-11 RX ORDER — EPHEDRINE SULFATE 50 MG/ML
INJECTION INTRAVENOUS AS NEEDED
Status: DISCONTINUED | OUTPATIENT
Start: 2025-08-11 | End: 2025-08-11 | Stop reason: SURG

## 2025-08-11 RX ORDER — LIDOCAINE HYDROCHLORIDE 10 MG/ML
INJECTION, SOLUTION EPIDURAL; INFILTRATION; INTRACAUDAL; PERINEURAL AS NEEDED
Status: DISCONTINUED | OUTPATIENT
Start: 2025-08-11 | End: 2025-08-11 | Stop reason: SURG

## 2025-08-11 RX ORDER — SODIUM CHLORIDE, SODIUM LACTATE, POTASSIUM CHLORIDE, CALCIUM CHLORIDE 600; 310; 30; 20 MG/100ML; MG/100ML; MG/100ML; MG/100ML
INJECTION, SOLUTION INTRAVENOUS CONTINUOUS
Status: DISCONTINUED | OUTPATIENT
Start: 2025-08-11 | End: 2025-08-11

## 2025-08-11 RX ORDER — NALOXONE HYDROCHLORIDE 0.4 MG/ML
0.08 INJECTION, SOLUTION INTRAMUSCULAR; INTRAVENOUS; SUBCUTANEOUS AS NEEDED
Status: DISCONTINUED | OUTPATIENT
Start: 2025-08-11 | End: 2025-08-11

## 2025-08-11 RX ORDER — OXYCODONE HYDROCHLORIDE 5 MG/1
5 TABLET ORAL ONCE AS NEEDED
Status: COMPLETED | OUTPATIENT
Start: 2025-08-11 | End: 2025-08-11

## 2025-08-11 RX ORDER — SENNA AND DOCUSATE SODIUM 50; 8.6 MG/1; MG/1
1 TABLET, FILM COATED ORAL DAILY
Qty: 30 TABLET | Refills: 0 | Status: SHIPPED | OUTPATIENT
Start: 2025-08-11

## 2025-08-11 RX ORDER — BUPIVACAINE HYDROCHLORIDE 2.5 MG/ML
INJECTION, SOLUTION EPIDURAL; INFILTRATION; INTRACAUDAL; PERINEURAL AS NEEDED
Status: DISCONTINUED | OUTPATIENT
Start: 2025-08-11 | End: 2025-08-11 | Stop reason: HOSPADM

## 2025-08-11 RX ORDER — GLYCOPYRROLATE 0.2 MG/ML
INJECTION, SOLUTION INTRAMUSCULAR; INTRAVENOUS AS NEEDED
Status: DISCONTINUED | OUTPATIENT
Start: 2025-08-11 | End: 2025-08-11 | Stop reason: SURG

## 2025-08-11 RX ORDER — HYDROCODONE BITARTRATE AND ACETAMINOPHEN 5; 325 MG/1; MG/1
1 TABLET ORAL ONCE AS NEEDED
Status: DISCONTINUED | OUTPATIENT
Start: 2025-08-11 | End: 2025-08-11

## 2025-08-11 RX ORDER — HYDROMORPHONE HYDROCHLORIDE 1 MG/ML
0.6 INJECTION, SOLUTION INTRAMUSCULAR; INTRAVENOUS; SUBCUTANEOUS EVERY 5 MIN PRN
Status: DISCONTINUED | OUTPATIENT
Start: 2025-08-11 | End: 2025-08-11

## 2025-08-11 RX ORDER — SCOPOLAMINE 1 MG/3D
1 PATCH, EXTENDED RELEASE TRANSDERMAL ONCE
Status: DISCONTINUED | OUTPATIENT
Start: 2025-08-11 | End: 2025-08-11 | Stop reason: HOSPADM

## 2025-08-11 RX ORDER — MEPERIDINE HYDROCHLORIDE 25 MG/ML
12.5 INJECTION INTRAMUSCULAR; INTRAVENOUS; SUBCUTANEOUS AS NEEDED
Status: DISCONTINUED | OUTPATIENT
Start: 2025-08-11 | End: 2025-08-11

## 2025-08-11 RX ORDER — ACETAMINOPHEN 500 MG
1000 TABLET ORAL ONCE
Status: DISCONTINUED | OUTPATIENT
Start: 2025-08-11 | End: 2025-08-11 | Stop reason: HOSPADM

## 2025-08-11 RX ORDER — LIDOCAINE HYDROCHLORIDE ANHYDROUS AND DEXTROSE MONOHYDRATE .8; 5 G/100ML; G/100ML
INJECTION, SOLUTION INTRAVENOUS CONTINUOUS PRN
Status: DISCONTINUED | OUTPATIENT
Start: 2025-08-11 | End: 2025-08-11 | Stop reason: SURG

## 2025-08-11 RX ORDER — PROCHLORPERAZINE EDISYLATE 5 MG/ML
5 INJECTION INTRAMUSCULAR; INTRAVENOUS EVERY 8 HOURS PRN
Status: DISCONTINUED | OUTPATIENT
Start: 2025-08-11 | End: 2025-08-11

## 2025-08-11 RX ORDER — MIDAZOLAM HYDROCHLORIDE 1 MG/ML
1 INJECTION INTRAMUSCULAR; INTRAVENOUS EVERY 5 MIN PRN
Status: DISCONTINUED | OUTPATIENT
Start: 2025-08-11 | End: 2025-08-11

## 2025-08-11 RX ORDER — HYDROMORPHONE HYDROCHLORIDE 1 MG/ML
0.4 INJECTION, SOLUTION INTRAMUSCULAR; INTRAVENOUS; SUBCUTANEOUS EVERY 5 MIN PRN
Status: DISCONTINUED | OUTPATIENT
Start: 2025-08-11 | End: 2025-08-11

## 2025-08-11 RX ORDER — OXYCODONE HYDROCHLORIDE 5 MG/1
10 TABLET ORAL ONCE AS NEEDED
Status: COMPLETED | OUTPATIENT
Start: 2025-08-11 | End: 2025-08-11

## 2025-08-11 RX ORDER — DIPHENHYDRAMINE HYDROCHLORIDE 50 MG/ML
12.5 INJECTION, SOLUTION INTRAMUSCULAR; INTRAVENOUS AS NEEDED
Status: DISCONTINUED | OUTPATIENT
Start: 2025-08-11 | End: 2025-08-11

## 2025-08-11 RX ORDER — DEXAMETHASONE SODIUM PHOSPHATE 4 MG/ML
VIAL (ML) INJECTION AS NEEDED
Status: DISCONTINUED | OUTPATIENT
Start: 2025-08-11 | End: 2025-08-11 | Stop reason: SURG

## 2025-08-11 RX ORDER — ACETAMINOPHEN 10 MG/ML
INJECTION, SOLUTION INTRAVENOUS AS NEEDED
Status: DISCONTINUED | OUTPATIENT
Start: 2025-08-11 | End: 2025-08-11 | Stop reason: SURG

## 2025-08-11 RX ORDER — ACETAMINOPHEN 500 MG
1000 TABLET ORAL ONCE AS NEEDED
Status: DISCONTINUED | OUTPATIENT
Start: 2025-08-11 | End: 2025-08-11

## 2025-08-11 RX ORDER — NEOSTIGMINE METHYLSULFATE 1 MG/ML
INJECTION INTRAVENOUS AS NEEDED
Status: DISCONTINUED | OUTPATIENT
Start: 2025-08-11 | End: 2025-08-11 | Stop reason: SURG

## 2025-08-11 RX ORDER — ONDANSETRON 2 MG/ML
INJECTION INTRAMUSCULAR; INTRAVENOUS AS NEEDED
Status: DISCONTINUED | OUTPATIENT
Start: 2025-08-11 | End: 2025-08-11 | Stop reason: SURG

## 2025-08-11 RX ORDER — ONDANSETRON 2 MG/ML
4 INJECTION INTRAMUSCULAR; INTRAVENOUS EVERY 6 HOURS PRN
Status: DISCONTINUED | OUTPATIENT
Start: 2025-08-11 | End: 2025-08-11

## 2025-08-11 RX ORDER — HEPARIN SODIUM 5000 [USP'U]/ML
5000 INJECTION, SOLUTION INTRAVENOUS; SUBCUTANEOUS ONCE
Status: COMPLETED | OUTPATIENT
Start: 2025-08-11 | End: 2025-08-11

## 2025-08-11 RX ORDER — HYDROCODONE BITARTRATE AND ACETAMINOPHEN 5; 325 MG/1; MG/1
2 TABLET ORAL ONCE AS NEEDED
Status: DISCONTINUED | OUTPATIENT
Start: 2025-08-11 | End: 2025-08-11

## 2025-08-11 RX ORDER — HYDROMORPHONE HYDROCHLORIDE 1 MG/ML
0.2 INJECTION, SOLUTION INTRAMUSCULAR; INTRAVENOUS; SUBCUTANEOUS EVERY 5 MIN PRN
Status: DISCONTINUED | OUTPATIENT
Start: 2025-08-11 | End: 2025-08-11

## 2025-08-11 RX ORDER — KETOROLAC TROMETHAMINE 30 MG/ML
INJECTION, SOLUTION INTRAMUSCULAR; INTRAVENOUS AS NEEDED
Status: DISCONTINUED | OUTPATIENT
Start: 2025-08-11 | End: 2025-08-11 | Stop reason: SURG

## 2025-08-11 RX ORDER — ROCURONIUM BROMIDE 10 MG/ML
INJECTION, SOLUTION INTRAVENOUS AS NEEDED
Status: DISCONTINUED | OUTPATIENT
Start: 2025-08-11 | End: 2025-08-11 | Stop reason: SURG

## 2025-08-11 RX ORDER — OXYCODONE HYDROCHLORIDE 5 MG/1
5 TABLET ORAL EVERY 6 HOURS PRN
Qty: 10 TABLET | Refills: 0 | Status: SHIPPED | OUTPATIENT
Start: 2025-08-11 | End: 2025-08-25 | Stop reason: ALTCHOICE

## 2025-08-11 RX ADMIN — ROCURONIUM BROMIDE 50 MG: 10 INJECTION, SOLUTION INTRAVENOUS at 12:37:00

## 2025-08-11 RX ADMIN — KETOROLAC TROMETHAMINE 30 MG: 30 INJECTION, SOLUTION INTRAMUSCULAR; INTRAVENOUS at 13:46:00

## 2025-08-11 RX ADMIN — GLYCOPYRROLATE 0.2 MG: 0.2 INJECTION, SOLUTION INTRAMUSCULAR; INTRAVENOUS at 13:06:00

## 2025-08-11 RX ADMIN — LIDOCAINE HYDROCHLORIDE ANHYDROUS AND DEXTROSE MONOHYDRATE 2 MG/KG/HR: .8; 5 INJECTION, SOLUTION INTRAVENOUS at 12:40:00

## 2025-08-11 RX ADMIN — SODIUM CHLORIDE, SODIUM LACTATE, POTASSIUM CHLORIDE, CALCIUM CHLORIDE: 600; 310; 30; 20 INJECTION, SOLUTION INTRAVENOUS at 14:04:00

## 2025-08-11 RX ADMIN — SODIUM CHLORIDE, SODIUM LACTATE, POTASSIUM CHLORIDE, CALCIUM CHLORIDE: 600; 310; 30; 20 INJECTION, SOLUTION INTRAVENOUS at 12:33:00

## 2025-08-11 RX ADMIN — ONDANSETRON 4 MG: 2 INJECTION INTRAMUSCULAR; INTRAVENOUS at 13:46:00

## 2025-08-11 RX ADMIN — LIDOCAINE HYDROCHLORIDE 50 MG: 10 INJECTION, SOLUTION EPIDURAL; INFILTRATION; INTRACAUDAL; PERINEURAL at 12:37:00

## 2025-08-11 RX ADMIN — EPHEDRINE SULFATE 10 MG: 50 INJECTION INTRAVENOUS at 13:10:00

## 2025-08-11 RX ADMIN — GLYCOPYRROLATE 0.2 MG: 0.2 INJECTION, SOLUTION INTRAMUSCULAR; INTRAVENOUS at 13:10:00

## 2025-08-11 RX ADMIN — GLYCOPYRROLATE 0.4 MG: 0.2 INJECTION, SOLUTION INTRAMUSCULAR; INTRAVENOUS at 13:46:00

## 2025-08-11 RX ADMIN — ACETAMINOPHEN 1000 MG: 10 INJECTION, SOLUTION INTRAVENOUS at 13:45:00

## 2025-08-11 RX ADMIN — NEOSTIGMINE METHYLSULFATE 3 MG: 1 INJECTION INTRAVENOUS at 13:46:00

## 2025-08-11 RX ADMIN — DEXAMETHASONE SODIUM PHOSPHATE 8 MG: 4 MG/ML VIAL (ML) INJECTION at 12:40:00

## 2025-08-11 RX ADMIN — LIDOCAINE HYDROCHLORIDE ANHYDROUS AND DEXTROSE MONOHYDRATE 2 MG/KG/HR: .8; 5 INJECTION, SOLUTION INTRAVENOUS at 13:14:00

## 2025-08-25 ENCOUNTER — OFFICE VISIT (OUTPATIENT)
Facility: LOCATION | Age: 60
End: 2025-08-25

## 2025-08-25 ENCOUNTER — PATIENT MESSAGE (OUTPATIENT)
Facility: LOCATION | Age: 60
End: 2025-08-25

## 2025-08-25 VITALS
TEMPERATURE: 98 F | OXYGEN SATURATION: 98 % | DIASTOLIC BLOOD PRESSURE: 82 MMHG | SYSTOLIC BLOOD PRESSURE: 121 MMHG | HEART RATE: 57 BPM

## 2025-08-25 DIAGNOSIS — Z98.890 POST-OPERATIVE STATE: Primary | ICD-10-CM

## (undated) DEVICE — COVER WAND CLR RF DTECT RF ASSURE

## (undated) DEVICE — ROBOTIC GENERAL CUSTOM PK

## (undated) DEVICE — SUT MCRYL 4-0 18IN PS-2 ABSRB UD 19MM 3/8 CIR

## (undated) DEVICE — Device

## (undated) DEVICE — DRAPE EXT W21XH19XL10.5IN DA VINCI XI

## (undated) DEVICE — TRAY CATH 16FR F INCL BARDX IC COMPLT CARE

## (undated) DEVICE — TROCAR 12MM L100 HASSAN NON BLADED W/BALLOON

## (undated) DEVICE — NEEDLE INSUF 13GA L120MM LAP SPR LD BLNT STYL

## (undated) DEVICE — GLOVE SUR 8 SENSICARE PI PIP CRM PWD F

## (undated) DEVICE — DRAPE TOP 102X53IN ABSRB REINF HK AND LOOP LN

## (undated) DEVICE — STICK SPNG 8IN MED POVIDONE IOD PAINT

## (undated) DEVICE — COVER LT HNDL RIG FOR SUR CAM DISP

## (undated) DEVICE — SUT ETHBND XL 0 18IN NABSRB GRN CR 26MM CT-2 1/2 CIR

## (undated) DEVICE — SOLUTION ANTIFOG VIS SYS CLEARIFY LAPSCP

## (undated) NOTE — LETTER
Date: 11/30/2023    Patient Name: Glenroy [facility]          To Whom it may concern: The above patient was seen at the [facility] for treatment of a medical condition, and is currently under my medical care. The patient may return to work on 11/30/23 with the following limitations: No repetitive overhead activities or overhead lifting more than 20lbs. May lift as tolerated floor to chest level. These limitations will be in place for the next 4 weeks, at which time the patient will be re-evaluated. If you have any questions or concerns, please contact our office.          Sincerely,    Palmer Gonzalez MD

## (undated) NOTE — LETTER
Date: 10/25/2023    Patient Name: Leyda Madrigal          To Whom it may concern: The above patient was seen at the [facility] for treatment of a medical condition, and is currently under my medical care. The patient may return to work on 10/26/23 with the following limitations for the next 6 weeks: No pushing, pulling, lifting, carrying 20lbs or greater. No overhead or reaching. If you have any questions or concerns, please contact our office.            Sincerely,    Madelyn Mane MD

## (undated) NOTE — LETTER
Date: 8/10/2023    Patient Name: Dorian Toney          To Whom it may concern: The above patient was seen at the [facility] for treatment of a medical condition. This patient should be excused from attending work from 07/16/2023 to 09/21/2023.         Sincerely,    Otto Sarkar MD

## (undated) NOTE — MR AVS SNAPSHOT
After Visit Summary   12/1/2017    Mary Harp    MRN: [id number]           Visit Information     Date & Time  12/1/2017  9:00 PM Provider  SCHEDULE BY [facility]  [address] [place], [place] [address].  Phone  [phone]      Allergies as of 1 illness or injury that does   not require immediate attention   VIDEO VISITS  Average cost  $35*    e-VISITS  Average cost  $35*      [address]  Monday - Friday  10:00 am - 10:00 pm

## (undated) NOTE — LETTER
Date: 11/30/2023    Patient Name: Mat Kessler          To Whom it may concern: The above patient was seen at the [facility] for treatment of a medical condition, and is currently under my medical care. The patient may return to work on 11/30/23 with the following limitations: No repetitive overhead activities or overhead lifting more than 20lbs. May lift as tolerated floor to chest level. If you have any questions or concerns, please contact our office.       Sincerely,    Marito Garcia MD

## (undated) NOTE — LETTER
Date: 12/28/2023    Patient Name: Arturo Guevara          To Whom it may concern: The above patient was seen at the [facility] for treatment of a medical condition, and is currently under my medical care. The patient may return to work, full-duty and no restrictions, on 12/29/23. If you have any questions or concerns, please contact our office.         Sincerely,    Jamee Kent MD